# Patient Record
Sex: FEMALE | Race: WHITE | NOT HISPANIC OR LATINO
[De-identification: names, ages, dates, MRNs, and addresses within clinical notes are randomized per-mention and may not be internally consistent; named-entity substitution may affect disease eponyms.]

---

## 2019-04-30 PROBLEM — Z00.00 ENCOUNTER FOR PREVENTIVE HEALTH EXAMINATION: Status: ACTIVE | Noted: 2019-04-30

## 2019-05-01 ENCOUNTER — APPOINTMENT (OUTPATIENT)
Dept: INTERNAL MEDICINE | Facility: CLINIC | Age: 39
End: 2019-05-01
Payer: MEDICAID

## 2019-05-01 VITALS
SYSTOLIC BLOOD PRESSURE: 134 MMHG | WEIGHT: 138.1 LBS | OXYGEN SATURATION: 93 % | TEMPERATURE: 98.4 F | BODY MASS INDEX: 24.17 KG/M2 | HEIGHT: 63.39 IN | DIASTOLIC BLOOD PRESSURE: 100 MMHG | HEART RATE: 99 BPM

## 2019-05-01 DIAGNOSIS — Z78.9 OTHER SPECIFIED HEALTH STATUS: ICD-10-CM

## 2019-05-01 DIAGNOSIS — R41.840 ATTENTION AND CONCENTRATION DEFICIT: ICD-10-CM

## 2019-05-01 DIAGNOSIS — F90.1 ATTENTION-DEFICIT HYPERACTIVITY DISORDER, PREDOMINANTLY HYPERACTIVE TYPE: ICD-10-CM

## 2019-05-01 PROCEDURE — 99203 OFFICE O/P NEW LOW 30 MIN: CPT | Mod: 25

## 2019-05-01 PROCEDURE — G0442 ANNUAL ALCOHOL SCREEN 15 MIN: CPT

## 2019-05-01 PROCEDURE — G0444 DEPRESSION SCREEN ANNUAL: CPT

## 2019-05-01 PROCEDURE — 99385 PREV VISIT NEW AGE 18-39: CPT | Mod: 25

## 2019-05-02 PROBLEM — Z78.9 CURRENT NON-SMOKER: Status: ACTIVE | Noted: 2019-05-02

## 2019-05-02 PROBLEM — Z78.9 CONSUMES ALCOHOL OCCASIONALLY: Status: ACTIVE | Noted: 2019-05-02

## 2019-05-02 PROBLEM — F90.1 ADHD, PREDOMINANTLY HYPERACTIVE-IMPULSIVE SUBTYPE: Status: RESOLVED | Noted: 2019-05-02 | Resolved: 2019-05-02

## 2019-05-02 NOTE — ASSESSMENT
[FreeTextEntry1] : 1) Annual Physical Exam: patient to schedule follow up for her gynecologic exam and Pap smear patient follows up with dentist. Advise sunscreen use and seatbelt use.\par 2) Poor concentration: Patient has clinical features of ADHD, will start patient on Adderall and to use only as needed. Advised to use judiciously within the first 2 weeks the patient has elevated blood pressure advised not to take with alcohol and has no other drug use. Also the patient is an exercise regimen. Patient has child diagnosed with ADHD.\par 3) Elevated Blood Pressure: Repeat blood pressure 130/94, patient asymptomatic during exam. Patient was in a rush to go to a meeting which may have contributed to elevated blood pressure. Advised not to take Adderall frequently during the first 2 weeks given the elevated blood pressure. Patient to return in 2 weeks for blood pressure check, blood work and EKG. Patient follows low salt diet.

## 2019-05-02 NOTE — HISTORY OF PRESENT ILLNESS
[FreeTextEntry1] : Patient presents to establish care, and difficulty concentration [de-identified] : Patient states that she has difficulty with concentration and carrying out multiple tasks. Patient has had this in the past and was diagnosed with ADHD and had been given Habitrol. Patient did not tolerate the side effects and lateral x-ray. Patient denies any alcohol or drug use or history of infections. Patient was never diagnosed with any learning disabilities. Patient has history of parotitis in activities and organizing tasks. This makes patient difficult for patient to fulfill obligations at work. Patient states she feels fidgety and restless. Patient denies any chest pain chest tightness or shortness of breath. Patient denies any anxiety

## 2019-05-02 NOTE — HEALTH RISK ASSESSMENT
[Very Good] : ~his/her~  mood as very good [] : No [de-identified] : None [0] : 2) Feeling down, depressed, or hopeless: Not at all (0) [de-identified] : Healthy, low fat diet [de-identified] : Does not currently exercise [PapSmearComments] : Patient to schedule appointment

## 2019-05-02 NOTE — PHYSICAL EXAM
[Well Nourished] : well nourished [No Acute Distress] : no acute distress [Well-Appearing] : well-appearing [Well Developed] : well developed [Normal Sclera/Conjunctiva] : normal sclera/conjunctiva [PERRL] : pupils equal round and reactive to light [EOMI] : extraocular movements intact [Normal TMs] : both tympanic membranes were normal [Normal Outer Ear/Nose] : the outer ears and nose were normal in appearance [Normal Oropharynx] : the oropharynx was normal [No Lymphadenopathy] : no lymphadenopathy [No JVD] : no jugular venous distention [Supple] : supple [Thyroid Normal, No Nodules] : the thyroid was normal and there were no nodules present [No Respiratory Distress] : no respiratory distress  [Clear to Auscultation] : lungs were clear to auscultation bilaterally [Regular Rhythm] : with a regular rhythm [Normal Rate] : normal rate  [No Accessory Muscle Use] : no accessory muscle use [No Carotid Bruits] : no carotid bruits [Normal S1, S2] : normal S1 and S2 [No Murmur] : no murmur heard [No Abdominal Bruit] : a ~M bruit was not heard ~T in the abdomen [No Varicosities] : no varicosities [Pedal Pulses Present] : the pedal pulses are present [No Extremity Clubbing/Cyanosis] : no extremity clubbing/cyanosis [No Edema] : there was no peripheral edema [Soft] : abdomen soft [Non Tender] : non-tender [No Palpable Aorta] : no palpable aorta [No HSM] : no HSM [No Masses] : no abdominal mass palpated [Non-distended] : non-distended [Normal Axillary Nodes] : no axillary lymphadenopathy [Normal Supraclavicular Nodes] : no supraclavicular lymphadenopathy [Normal Bowel Sounds] : normal bowel sounds [Normal Anterior Cervical Nodes] : no anterior cervical lymphadenopathy [Normal Posterior Cervical Nodes] : no posterior cervical lymphadenopathy [No Spinal Tenderness] : no spinal tenderness [No CVA Tenderness] : no CVA  tenderness [Normal Inguinal Nodes] : no inguinal lymphadenopathy [Grossly Normal Strength/Tone] : grossly normal strength/tone [No Joint Swelling] : no joint swelling [No Skin Lesions] : no skin lesions [Normal Gait] : normal gait [No Rash] : no rash [No Focal Deficits] : no focal deficits [Coordination Grossly Intact] : coordination grossly intact [Speech Grossly Normal] : speech grossly normal [Normal Insight/Judgement] : insight and judgment were intact [Normal Affect] : the affect was normal

## 2020-01-13 ENCOUNTER — APPOINTMENT (OUTPATIENT)
Dept: INTERNAL MEDICINE | Facility: CLINIC | Age: 40
End: 2020-01-13
Payer: MEDICAID

## 2020-01-13 ENCOUNTER — NON-APPOINTMENT (OUTPATIENT)
Age: 40
End: 2020-01-13

## 2020-01-13 VITALS
TEMPERATURE: 97.8 F | BODY MASS INDEX: 22.41 KG/M2 | HEIGHT: 63.39 IN | HEART RATE: 104 BPM | WEIGHT: 128.07 LBS | OXYGEN SATURATION: 99 % | DIASTOLIC BLOOD PRESSURE: 97 MMHG | SYSTOLIC BLOOD PRESSURE: 130 MMHG

## 2020-01-13 DIAGNOSIS — F41.9 ANXIETY DISORDER, UNSPECIFIED: ICD-10-CM

## 2020-01-13 DIAGNOSIS — R25.2 CRAMP AND SPASM: ICD-10-CM

## 2020-01-13 PROCEDURE — 36415 COLL VENOUS BLD VENIPUNCTURE: CPT

## 2020-01-13 PROCEDURE — 99214 OFFICE O/P EST MOD 30 MIN: CPT | Mod: 25

## 2020-01-13 PROCEDURE — 93000 ELECTROCARDIOGRAM COMPLETE: CPT

## 2020-01-15 DIAGNOSIS — R79.89 OTHER SPECIFIED ABNORMAL FINDINGS OF BLOOD CHEMISTRY: ICD-10-CM

## 2020-01-15 LAB
25(OH)D3 SERPL-MCNC: 5.9 NG/ML
ALBUMIN SERPL ELPH-MCNC: 4.9 G/DL
ALP BLD-CCNC: 61 U/L
ALT SERPL-CCNC: 13 U/L
ANION GAP SERPL CALC-SCNC: 14 MMOL/L
AST SERPL-CCNC: 17 U/L
BASOPHILS # BLD AUTO: 0.05 K/UL
BASOPHILS NFR BLD AUTO: 0.5 %
BILIRUB SERPL-MCNC: 0.3 MG/DL
BUN SERPL-MCNC: 15 MG/DL
CALCIUM SERPL-MCNC: 9.5 MG/DL
CHLORIDE SERPL-SCNC: 101 MMOL/L
CHOLEST SERPL-MCNC: 272 MG/DL
CHOLEST/HDLC SERPL: 3.3 RATIO
CO2 SERPL-SCNC: 26 MMOL/L
CREAT SERPL-MCNC: 0.69 MG/DL
EOSINOPHIL # BLD AUTO: 0.02 K/UL
EOSINOPHIL NFR BLD AUTO: 0.2 %
ESTIMATED AVERAGE GLUCOSE: 94 MG/DL
GLUCOSE SERPL-MCNC: 86 MG/DL
HBA1C MFR BLD HPLC: 4.9 %
HCT VFR BLD CALC: 41.3 %
HDLC SERPL-MCNC: 82 MG/DL
HGB BLD-MCNC: 12.8 G/DL
IMM GRANULOCYTES NFR BLD AUTO: 0.4 %
LDLC SERPL CALC-MCNC: 140 MG/DL
LYMPHOCYTES # BLD AUTO: 1.85 K/UL
LYMPHOCYTES NFR BLD AUTO: 16.9 %
MAGNESIUM SERPL-MCNC: 2.1 MG/DL
MAN DIFF?: NORMAL
MCHC RBC-ENTMCNC: 29.4 PG
MCHC RBC-ENTMCNC: 31 GM/DL
MCV RBC AUTO: 94.7 FL
MONOCYTES # BLD AUTO: 0.68 K/UL
MONOCYTES NFR BLD AUTO: 6.2 %
NEUTROPHILS # BLD AUTO: 8.3 K/UL
NEUTROPHILS NFR BLD AUTO: 75.8 %
PLATELET # BLD AUTO: 343 K/UL
POTASSIUM SERPL-SCNC: 4.4 MMOL/L
PROT SERPL-MCNC: 7.4 G/DL
RBC # BLD: 4.36 M/UL
RBC # FLD: 12.9 %
SODIUM SERPL-SCNC: 141 MMOL/L
TRIGL SERPL-MCNC: 248 MG/DL
TSH SERPL-ACNC: 0.9 UIU/ML
WBC # FLD AUTO: 10.94 K/UL

## 2020-01-15 NOTE — ASSESSMENT
[FreeTextEntry1] : 1) Jerking movements: possibly secondary to panic attack, patient may also have had vasovagal episode. Place neurology referral r/o seizure. Check electrolytes, thyroid and magnesium. \par 2) Near syncope: EKG sinus rhythm, no heart block appreciated, place cardiology referral for further recommendations\par 3) Anxiety: Patient has been under stress with recent events, discussed behavioral health counseling and possibly SSRI. Side effects discussed with patient. If so chooses to start rto in 4 weeks. \par 4) Elevated BP: repeat 136/78. Stress anxiety, likely contributing.
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
## 2020-01-16 ENCOUNTER — MESSAGE (OUTPATIENT)
Age: 40
End: 2020-01-16

## 2020-01-16 PROBLEM — R79.89 LOW VITAMIN D LEVEL: Status: ACTIVE | Noted: 2020-01-16

## 2020-08-28 ENCOUNTER — LABORATORY RESULT (OUTPATIENT)
Age: 40
End: 2020-08-28

## 2020-08-28 ENCOUNTER — NON-APPOINTMENT (OUTPATIENT)
Age: 40
End: 2020-08-28

## 2020-08-28 ENCOUNTER — APPOINTMENT (OUTPATIENT)
Dept: INTERNAL MEDICINE | Facility: CLINIC | Age: 40
End: 2020-08-28
Payer: MEDICAID

## 2020-08-28 VITALS
SYSTOLIC BLOOD PRESSURE: 144 MMHG | HEART RATE: 85 BPM | BODY MASS INDEX: 24.76 KG/M2 | OXYGEN SATURATION: 100 % | HEIGHT: 63.39 IN | TEMPERATURE: 98.9 F | DIASTOLIC BLOOD PRESSURE: 102 MMHG | WEIGHT: 141.52 LBS

## 2020-08-28 VITALS — SYSTOLIC BLOOD PRESSURE: 138 MMHG | DIASTOLIC BLOOD PRESSURE: 90 MMHG

## 2020-08-28 DIAGNOSIS — R03.0 ELEVATED BLOOD-PRESSURE READING, W/OUT DIAGNOSIS OF HYPERTENSION: ICD-10-CM

## 2020-08-28 PROCEDURE — 36415 COLL VENOUS BLD VENIPUNCTURE: CPT

## 2020-08-28 PROCEDURE — 99395 PREV VISIT EST AGE 18-39: CPT | Mod: 25

## 2020-08-28 PROCEDURE — 93000 ELECTROCARDIOGRAM COMPLETE: CPT

## 2020-08-28 PROCEDURE — 99213 OFFICE O/P EST LOW 20 MIN: CPT | Mod: 25

## 2020-08-28 RX ORDER — SERTRALINE 25 MG/1
25 TABLET, FILM COATED ORAL
Qty: 60 | Refills: 0 | Status: DISCONTINUED | COMMUNITY
Start: 2020-01-13 | End: 2020-08-28

## 2020-09-02 NOTE — HEALTH RISK ASSESSMENT
[FreeTextEntry1] : health maintenance [Good] : ~his/her~  mood as  good [Yes] : Yes [] : No [2 - 4 times a month (2 pts)] : 2-4 times a month (2 points) [de-identified] : none [de-identified] : none [PapSmearDate] : 1/2020 [Patient reported PAP Smear was normal] : Patient reported PAP Smear was normal

## 2020-09-02 NOTE — HISTORY OF PRESENT ILLNESS
[FreeTextEntry1] : Patient presents for her annual physical [de-identified] : Feels well overall, stress has improved, taking alprazolam as needed. Had Cardiologic workup which was unremarkable. Denies any chest pain shortness of breath palpitations lightheadedness. Denies any nausea vomiting headache

## 2020-09-02 NOTE — ASSESSMENT
[FreeTextEntry1] : Physical exam within normal limits anxiety well-controlled, patient had cardiac workup for elevated blood pressure reading which was unremarkable suspect white coat hypertension advised to keep a log at home and to check blood pressure return to the office in 2 weeks. Check vitamin D levels.

## 2020-09-03 LAB
25(OH)D3 SERPL-MCNC: 19.3 NG/ML
ALBUMIN SERPL ELPH-MCNC: 4.9 G/DL
ALP BLD-CCNC: 52 U/L
ALT SERPL-CCNC: 21 U/L
ANION GAP SERPL CALC-SCNC: 14 MMOL/L
AST SERPL-CCNC: 27 U/L
BASOPHILS # BLD AUTO: 0.04 K/UL
BASOPHILS NFR BLD AUTO: 0.6 %
BILIRUB SERPL-MCNC: 0.3 MG/DL
BUN SERPL-MCNC: 10 MG/DL
CALCIUM SERPL-MCNC: 9.4 MG/DL
CHLORIDE SERPL-SCNC: 100 MMOL/L
CHOLEST SERPL-MCNC: 260 MG/DL
CHOLEST/HDLC SERPL: 3.5 RATIO
CO2 SERPL-SCNC: 26 MMOL/L
CREAT SERPL-MCNC: 0.64 MG/DL
EOSINOPHIL # BLD AUTO: 0 K/UL
EOSINOPHIL NFR BLD AUTO: 0 %
ESTIMATED AVERAGE GLUCOSE: 91 MG/DL
GLUCOSE SERPL-MCNC: 89 MG/DL
HBA1C MFR BLD HPLC: 4.8 %
HCT VFR BLD CALC: 39.6 %
HDLC SERPL-MCNC: 75 MG/DL
HGB BLD-MCNC: 12.4 G/DL
IMM GRANULOCYTES NFR BLD AUTO: 0.3 %
LDLC SERPL CALC-MCNC: 157 MG/DL
LYMPHOCYTES # BLD AUTO: 1.53 K/UL
LYMPHOCYTES NFR BLD AUTO: 21.2 %
MAN DIFF?: NORMAL
MCHC RBC-ENTMCNC: 29.2 PG
MCHC RBC-ENTMCNC: 31.3 GM/DL
MCV RBC AUTO: 93.4 FL
MONOCYTES # BLD AUTO: 0.47 K/UL
MONOCYTES NFR BLD AUTO: 6.5 %
NEUTROPHILS # BLD AUTO: 5.16 K/UL
NEUTROPHILS NFR BLD AUTO: 71.4 %
PLATELET # BLD AUTO: 304 K/UL
POTASSIUM SERPL-SCNC: 4.5 MMOL/L
PROT SERPL-MCNC: 7.4 G/DL
RBC # BLD: 4.24 M/UL
RBC # FLD: 13.2 %
SODIUM SERPL-SCNC: 139 MMOL/L
TRIGL SERPL-MCNC: 139 MG/DL
TSH SERPL-ACNC: 0.77 UIU/ML
WBC # FLD AUTO: 7.22 K/UL

## 2021-01-22 ENCOUNTER — NON-APPOINTMENT (OUTPATIENT)
Age: 41
End: 2021-01-22

## 2021-01-25 ENCOUNTER — NON-APPOINTMENT (OUTPATIENT)
Age: 41
End: 2021-01-25

## 2021-10-13 ENCOUNTER — NON-APPOINTMENT (OUTPATIENT)
Age: 41
End: 2021-10-13

## 2021-10-15 ENCOUNTER — APPOINTMENT (OUTPATIENT)
Dept: INTERNAL MEDICINE | Facility: CLINIC | Age: 41
End: 2021-10-15

## 2021-12-27 ENCOUNTER — NON-APPOINTMENT (OUTPATIENT)
Age: 41
End: 2021-12-27

## 2022-06-09 ENCOUNTER — NON-APPOINTMENT (OUTPATIENT)
Age: 42
End: 2022-06-09

## 2022-06-10 ENCOUNTER — APPOINTMENT (OUTPATIENT)
Dept: NEUROLOGY | Facility: CLINIC | Age: 42
End: 2022-06-10
Payer: MEDICAID

## 2022-06-10 VITALS — DIASTOLIC BLOOD PRESSURE: 130 MMHG | SYSTOLIC BLOOD PRESSURE: 180 MMHG

## 2022-06-10 VITALS — SYSTOLIC BLOOD PRESSURE: 171 MMHG | DIASTOLIC BLOOD PRESSURE: 119 MMHG | HEART RATE: 88 BPM

## 2022-06-10 DIAGNOSIS — R56.9 UNSPECIFIED CONVULSIONS: ICD-10-CM

## 2022-06-10 PROCEDURE — 99243 OFF/OP CNSLTJ NEW/EST LOW 30: CPT

## 2022-06-10 PROCEDURE — 99203 OFFICE O/P NEW LOW 30 MIN: CPT

## 2022-06-10 RX ORDER — DEXTROAMPHETAMINE SACCHARATE, AMPHETAMINE ASPARTATE, DEXTROAMPHETAMINE SULFATE AND AMPHETAMINE SULFATE 2.5; 2.5; 2.5; 2.5 MG/1; MG/1; MG/1; MG/1
10 TABLET ORAL DAILY
Qty: 30 | Refills: 0 | Status: DISCONTINUED | COMMUNITY
Start: 2019-05-01 | End: 2022-06-10

## 2022-06-10 NOTE — DISCUSSION/SUMMARY
[Generalized] : generalized  [Idiopathic] : idiopathic  [Risks Associated with Driving/[place] Law] : As per my usual protocol, the patient was advised in regards to risks and driving privileges associated with the [place] Guidelines.  [Safety Recommendations] : The patient was advised in regards to the risk of seizures and general seizure safety recommendations including not to be bathing alone, climbing to high places and operating heavy machinery. [Compliance with Medications] : The importance of compliance with medications was reinforced. [FreeTextEntry1] : Recurrent LOC x3, concern for convulsion\par Denies xanax misuse\par Recent onset since 2019\par HTN, not controlled, \par Vit D def\par Wt issues\par Anxiety issues\par \par Cardio eval for LOC, HTN\par \par MRI head.\par AEEG requested x48hrs\par safety issues discussed\par consider LTG titration\par \par x41min\par RTC3mo

## 2022-06-10 NOTE — PHYSICAL EXAM
[FreeTextEntry1] : General\par Left tongue bite, lip bruise anteriorly.\par \par Constitutional: alert and in no acute distress. \par Psychiatric: affect normal, insight and judgment intact.\par Neurologic: \par Orientation: oriented to person, place, and time \par Attention: normal concentrating ability and attention was not decreased. \par Language: no difficulty naming common objects, repeating a phrase, writing a sentence; fluency, comprehension, and reading intact. \par Fund of knowledge: displays adequate knowledge of personal past history. \par Cranial Nerves: visual acuity intact bilaterally, visual fields full to confrontation, pupils equal round and reactive to light, extraocular motion intact, facial sensation intact symmetrically, face symmetrical, hearing was intact bilaterally, tongue and palate midline, head turning and shoulder shrug symmetric and there was no tongue deviation with protrusion. \par Motor: the patient is right hand dominant. \par muscle tone was normal in all four extremities, muscle strength was normal in all four extremities and normal bulk in all four extremities. \par Coordination:. normal gait. balance was intact. there was no past-pointing. no tremor present. \par Deep tendon reflexes: \par Biceps right 2+. Biceps left 2+.  \par Triceps right 2+. Triceps left 2+.  \par Brachioradialis right 2+. Brachioradialis left 2+.  \par Patella right 2+. Patella left 2+.  \par Ankle jerk right 2+. Ankle jerk left 2+. \par Eyes: the sclera and conjunctiva were normal. \par Neck: the appearance of the neck was normal. \par Musculoskeletal: no clubbing or cyanosis of the fingernails. \par Skin: no lesions, rash\par
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
## 2022-06-10 NOTE — CONSULT LETTER
[Dear  ___] : Dear  [unfilled], [Consult Letter:] : I had the pleasure of evaluating your patient, [unfilled]. [( Thank you for referring [unfilled] for consultation for _____ )] : Thank you for referring [unfilled] for consultation for [unfilled] [Please see my note below.] : Please see my note below. [Consult Closing:] : Thank you very much for allowing me to participate in the care of this patient.  If you have any questions, please do not hesitate to contact me. [Sincerely,] : Sincerely, [FreeTextEntry2] : Corinna Schumacher MD [FreeTextEntry3] : Carlos Vargas MD, NEREIDA\par Board Certified: Neurology, Clinical Neurophysiology, Epilepsy\par , Department of Neurology\par Epilepsy Fellowship \par [facility]\par \par

## 2022-06-13 ENCOUNTER — TRANSCRIPTION ENCOUNTER (OUTPATIENT)
Age: 42
End: 2022-06-13

## 2022-06-13 ENCOUNTER — NON-APPOINTMENT (OUTPATIENT)
Age: 42
End: 2022-06-13

## 2022-06-13 ENCOUNTER — APPOINTMENT (OUTPATIENT)
Dept: INTERNAL MEDICINE | Facility: CLINIC | Age: 42
End: 2022-06-13
Payer: MEDICAID

## 2022-06-13 VITALS
SYSTOLIC BLOOD PRESSURE: 149 MMHG | OXYGEN SATURATION: 98 % | BODY MASS INDEX: 27.57 KG/M2 | HEART RATE: 50 BPM | DIASTOLIC BLOOD PRESSURE: 104 MMHG | HEIGHT: 62.6 IN | WEIGHT: 153.66 LBS | TEMPERATURE: 98 F

## 2022-06-13 VITALS — SYSTOLIC BLOOD PRESSURE: 144 MMHG | DIASTOLIC BLOOD PRESSURE: 98 MMHG

## 2022-06-13 PROCEDURE — 99214 OFFICE O/P EST MOD 30 MIN: CPT | Mod: 25

## 2022-06-13 PROCEDURE — 99396 PREV VISIT EST AGE 40-64: CPT | Mod: 25

## 2022-06-13 RX ORDER — AMLODIPINE BESYLATE 10 MG/1
10 TABLET ORAL DAILY
Qty: 90 | Refills: 3 | Status: DISCONTINUED | COMMUNITY
Start: 2022-02-18 | End: 2022-06-13

## 2022-06-13 NOTE — ADDENDUM
[FreeTextEntry1] : I, Georgia Jerome, acted as a scribe on behalf of Dr. Del Schumacher MD, on 06/13/2022. \par \par All medical entries made by the scribe were at my, Dr. Del Schumacher MD, direction and personally dictated by me on 06/13/2022. I have reviewed the chart and agree that the record accurately reflects my personal performance of the history, physical exam, assessment and plan. I have also personally directed, reviewed, and agreed with the chart.

## 2022-06-13 NOTE — REVIEW OF SYSTEMS
[Negative] : Heme/Lymph [Confusion] : confusion [FreeTextEntry9] : Shoulder pain and R neck pain. [de-identified] : Seizure episode

## 2022-06-13 NOTE — HISTORY OF PRESENT ILLNESS
[FreeTextEntry1] : Patient presents for annual physical exam. [de-identified] : Patient notes she was in the hospital for seizure. EKG was done in the hospital\par Pt States she hit her head, two contusions and LOC.\par She was confused, notes no precursor symptoms and Bit her lip. \par Denies any blurry vision, double vision.\par Denies flashing lights, or sounds.\par Neurology doubled her blood pressure medication and currently blood pressure came down.\par Pt will have appointment with cardiology.\par \par She has been watching her diet.\par Drinks 2-3 glasses of wine a week. Denies any drug use.\par Denies CP, chest tightness or SOB.\par Stress has been high. She has been going for walks.\par \par Also c/o Shoulder pain and R neck pain going on for 1 month. Denies any radiating pain.\par Pt has been to GYN and will have scheduled MRI.

## 2022-06-13 NOTE — ASSESSMENT
[FreeTextEntry1] : Annual Physical Exam\par -Blood work done today.\par -Repeat blood pressure is acceptable. Start trial of amlodipine - olmesartan.-f/u cardiology, repeat within 2 weeks\par -Repeat EKG today.\par -Continue f/u with neurology\par -Pt has appointment with GYN and Cardiology\par -Pt has scheduled MRI in a week.\par -RTO annually or as needed.

## 2022-06-14 RX ORDER — AMLODIPINE AND OLMESARTAN MEDOXOMIL 10; 20 MG/1; MG/1
10-20 TABLET ORAL DAILY
Qty: 90 | Refills: 3 | Status: DISCONTINUED | COMMUNITY
Start: 2022-06-13 | End: 2022-06-14

## 2022-06-18 ENCOUNTER — TRANSCRIPTION ENCOUNTER (OUTPATIENT)
Age: 42
End: 2022-06-18

## 2022-06-18 LAB
25(OH)D3 SERPL-MCNC: 7.2 NG/ML
ALBUMIN SERPL ELPH-MCNC: 4.8 G/DL
ALP BLD-CCNC: 65 U/L
ALT SERPL-CCNC: 24 U/L
ANION GAP SERPL CALC-SCNC: 13 MMOL/L
AST SERPL-CCNC: 25 U/L
BASOPHILS # BLD AUTO: 0.04 K/UL
BASOPHILS NFR BLD AUTO: 0.5 %
BILIRUB SERPL-MCNC: 0.3 MG/DL
BUN SERPL-MCNC: 7 MG/DL
CALCIUM SERPL-MCNC: 9.4 MG/DL
CHLORIDE SERPL-SCNC: 100 MMOL/L
CHOLEST SERPL-MCNC: 269 MG/DL
CO2 SERPL-SCNC: 25 MMOL/L
CREAT SERPL-MCNC: 0.61 MG/DL
EGFR: 116 ML/MIN/1.73M2
EOSINOPHIL # BLD AUTO: 0.01 K/UL
EOSINOPHIL NFR BLD AUTO: 0.1 %
ESTIMATED AVERAGE GLUCOSE: 100 MG/DL
GLUCOSE SERPL-MCNC: 98 MG/DL
HBA1C MFR BLD HPLC: 5.1 %
HCT VFR BLD CALC: 38.3 %
HDLC SERPL-MCNC: 80 MG/DL
HGB BLD-MCNC: 12 G/DL
IMM GRANULOCYTES NFR BLD AUTO: 0.2 %
LDLC SERPL CALC-MCNC: 146 MG/DL
LYMPHOCYTES # BLD AUTO: 1.47 K/UL
LYMPHOCYTES NFR BLD AUTO: 17.8 %
MAN DIFF?: NORMAL
MCHC RBC-ENTMCNC: 29.3 PG
MCHC RBC-ENTMCNC: 31.3 GM/DL
MCV RBC AUTO: 93.6 FL
MONOCYTES # BLD AUTO: 0.62 K/UL
MONOCYTES NFR BLD AUTO: 7.5 %
NEUTROPHILS # BLD AUTO: 6.12 K/UL
NEUTROPHILS NFR BLD AUTO: 73.9 %
NONHDLC SERPL-MCNC: 189 MG/DL
PLATELET # BLD AUTO: 317 K/UL
POTASSIUM SERPL-SCNC: 4 MMOL/L
PROT SERPL-MCNC: 7.1 G/DL
RBC # BLD: 4.09 M/UL
RBC # FLD: 14.6 %
SODIUM SERPL-SCNC: 138 MMOL/L
TRIGL SERPL-MCNC: 212 MG/DL
TSH SERPL-ACNC: 1.23 UIU/ML
VIT B12 SERPL-MCNC: 510 PG/ML
WBC # FLD AUTO: 8.28 K/UL

## 2022-06-23 ENCOUNTER — NON-APPOINTMENT (OUTPATIENT)
Age: 42
End: 2022-06-23

## 2022-07-02 ENCOUNTER — TRANSCRIPTION ENCOUNTER (OUTPATIENT)
Age: 42
End: 2022-07-02

## 2022-07-03 ENCOUNTER — TRANSCRIPTION ENCOUNTER (OUTPATIENT)
Age: 42
End: 2022-07-03

## 2022-07-18 ENCOUNTER — APPOINTMENT (OUTPATIENT)
Dept: NEUROLOGY | Facility: CLINIC | Age: 42
End: 2022-07-18

## 2022-08-01 ENCOUNTER — TRANSCRIPTION ENCOUNTER (OUTPATIENT)
Age: 42
End: 2022-08-01

## 2022-08-29 ENCOUNTER — APPOINTMENT (OUTPATIENT)
Dept: NEUROLOGY | Facility: CLINIC | Age: 42
End: 2022-08-29

## 2022-08-31 ENCOUNTER — TRANSCRIPTION ENCOUNTER (OUTPATIENT)
Age: 42
End: 2022-08-31

## 2022-09-19 ENCOUNTER — EMERGENCY (EMERGENCY)
Facility: HOSPITAL | Age: 42
LOS: 1 days | Discharge: HOME | End: 2022-09-19
Admitting: EMERGENCY MEDICINE

## 2022-09-19 VITALS
TEMPERATURE: 98 F | HEART RATE: 89 BPM | RESPIRATION RATE: 18 BRPM | DIASTOLIC BLOOD PRESSURE: 100 MMHG | HEIGHT: 63 IN | OXYGEN SATURATION: 100 % | SYSTOLIC BLOOD PRESSURE: 169 MMHG

## 2022-09-19 PROCEDURE — 99284 EMERGENCY DEPT VISIT MOD MDM: CPT

## 2022-09-19 NOTE — ED PROVIDER NOTE - OBJECTIVE STATEMENT
40 Y/O F PMH Seizure Disorder, HTN states that in July she had a seizure and was seen at [facility]. States she had bit her tongue, states she had a CT of her head that she states was neg for facial fractures or intracranial bleeding. Pt states she has had increasing L sided facial swelling and mild pain for the past week. Pt states she was called in Amoxicillin 500 BID by the endodontist her mother works for which she has been taking for the past week but states the swelling has worsened. Pt states she took Ibuprofen 600 2 hours prior to ED arrival. Pt denies fever, chills, nightsweats or any other sx or acute complaints.

## 2022-09-19 NOTE — ED PROVIDER NOTE - PATIENT PORTAL LINK FT
You can access the FollowMyHealth Patient Portal offered by [facility] by registering at the following website: [email or web address]. By joining [employer]’s FollowMyHealth portal, you will also be able to view your health information using other applications (apps) compatible with our system.

## 2022-09-19 NOTE — ED PROVIDER NOTE - NSFOLLOWUPINSTRUCTIONS_ED_ALL_ED_FT
Call the [facility]   Advance activity as tolerated.  Continue all previously prescribed medications as directed.  Follow up with your primary care physician in 48-72 hours- bring copies of your results.  Return to the ER for worsening or persistent symptoms, and/or ANY NEW OR CONCERNING SYMPTOMS. If you have issues obtaining follow up, please call: [phone] to obtain a doctor or specialist who takes your insurance in your area.  You may call [phone] to make an appointment with the internal medicine clinic. Call the [facility] Dental clinic at [phone] [facility] [phone] at 830AM tomorrow. The phone number is . Call at 830AM when the clinic opens. Advance activity as tolerated.  Continue all previously prescribed medications as directed.  Follow up with your primary care physician in 48-72 hours- bring copies of your results.  Return to the ER for worsening or persistent symptoms, and/or ANY NEW OR CONCERNING SYMPTOMS.THIS INCLUDES BUT IS NOT LIMITED TO FEVER, CHILLS, NIGHTSWEATS, INCREASING REDNESS OR SWELLING OR FOR ANY OTHER SYMPTOMS THAT CONCERN YOU. If you have issues obtaining follow up, please call: [phone] to obtain a doctor or specialist who takes your insurance in your area.  You may call [phone] to make an appointment with the internal medicine clinic.

## 2022-09-19 NOTE — ED PROVIDER NOTE - CLINICAL SUMMARY MEDICAL DECISION MAKING FREE TEXT BOX
42 Y/O F PMH Seizure Disorder, HTN states that in July she had a seizure and was seen at [facility]. States she had bit her tongue, states she had a CT of her head that she states was neg for facial fractures or bleeding. Discussed with the dental resident, will attempt to expedite the patient for clinic follow up tomorrow, will send Augmentin and Naproxen to the patient's pharmacy.

## 2022-09-19 NOTE — ED PROVIDER NOTE - PHYSICAL EXAMINATION
HEENT: + Extraoral swelling. No gingival hypertrophy, erythema or fluctuance consistent with abscess.

## 2022-09-23 ENCOUNTER — APPOINTMENT (OUTPATIENT)
Dept: NEUROLOGY | Facility: CLINIC | Age: 42
End: 2022-09-23

## 2022-09-26 ENCOUNTER — APPOINTMENT (OUTPATIENT)
Dept: NEUROLOGY | Facility: CLINIC | Age: 42
End: 2022-09-26

## 2022-09-28 ENCOUNTER — TRANSCRIPTION ENCOUNTER (OUTPATIENT)
Age: 42
End: 2022-09-28

## 2022-10-28 ENCOUNTER — TRANSCRIPTION ENCOUNTER (OUTPATIENT)
Age: 42
End: 2022-10-28

## 2022-11-28 ENCOUNTER — TRANSCRIPTION ENCOUNTER (OUTPATIENT)
Age: 42
End: 2022-11-28

## 2022-12-28 ENCOUNTER — TRANSCRIPTION ENCOUNTER (OUTPATIENT)
Age: 42
End: 2022-12-28

## 2023-01-27 ENCOUNTER — TRANSCRIPTION ENCOUNTER (OUTPATIENT)
Age: 43
End: 2023-01-27

## 2023-02-23 ENCOUNTER — TRANSCRIPTION ENCOUNTER (OUTPATIENT)
Age: 43
End: 2023-02-23

## 2023-03-10 NOTE — ED ADULT TRIAGE NOTE - CHIEF COMPLAINT QUOTE
Please call pt regarding cough. Has had for past 3 days. No fever   pt c/o left lower tooth pain and was diagnosed with an infection.  + swelling noted to left side of face. pt is non-complaint with BP meds

## 2023-03-14 ENCOUNTER — TRANSCRIPTION ENCOUNTER (OUTPATIENT)
Age: 43
End: 2023-03-14

## 2023-04-15 ENCOUNTER — TRANSCRIPTION ENCOUNTER (OUTPATIENT)
Age: 43
End: 2023-04-15

## 2023-04-15 ENCOUNTER — RX RENEWAL (OUTPATIENT)
Age: 43
End: 2023-04-15

## 2023-05-17 ENCOUNTER — TRANSCRIPTION ENCOUNTER (OUTPATIENT)
Age: 43
End: 2023-05-17

## 2023-05-18 ENCOUNTER — RX RENEWAL (OUTPATIENT)
Age: 43
End: 2023-05-18

## 2023-06-20 ENCOUNTER — TRANSCRIPTION ENCOUNTER (OUTPATIENT)
Age: 43
End: 2023-06-20

## 2023-07-20 ENCOUNTER — TRANSCRIPTION ENCOUNTER (OUTPATIENT)
Age: 43
End: 2023-07-20

## 2023-07-31 ENCOUNTER — EMERGENCY (EMERGENCY)
Facility: HOSPITAL | Age: 43
LOS: 1 days | Discharge: HOME | End: 2023-07-31
Attending: EMERGENCY MEDICINE | Admitting: EMERGENCY MEDICINE
Payer: MEDICAID

## 2023-07-31 VITALS
OXYGEN SATURATION: 100 % | RESPIRATION RATE: 18 BRPM | TEMPERATURE: 98 F | HEART RATE: 90 BPM | SYSTOLIC BLOOD PRESSURE: 180 MMHG | DIASTOLIC BLOOD PRESSURE: 133 MMHG

## 2023-07-31 PROCEDURE — 99283 EMERGENCY DEPT VISIT LOW MDM: CPT

## 2023-07-31 RX ORDER — ALPRAZOLAM 0.25 MG
1 TABLET ORAL ONCE
Refills: 0 | Status: DISCONTINUED | OUTPATIENT
Start: 2023-07-31 | End: 2023-07-31

## 2023-07-31 RX ORDER — LOSARTAN POTASSIUM 100 MG/1
50 TABLET, FILM COATED ORAL ONCE
Refills: 0 | Status: COMPLETED | OUTPATIENT
Start: 2023-07-31 | End: 2023-07-31

## 2023-07-31 RX ADMIN — Medication 1 MILLIGRAM(S): at 23:48

## 2023-07-31 RX ADMIN — LOSARTAN POTASSIUM 50 MILLIGRAM(S): 100 TABLET, FILM COATED ORAL at 23:48

## 2023-07-31 NOTE — ED ADULT TRIAGE NOTE - CHIEF COMPLAINT QUOTE
[facility]. Patient reports she needs her xanax and blood pressure med or she will have a seizure. Reports headache and dizzy and that is normal when she misses her medications. Phx HTN, seizures

## 2023-07-31 NOTE — ED PROVIDER NOTE - NSICDXFAMILYHX_GEN_ALL_CORE_FT
LVM notifying she needs an annual scheduled before refills will be sent to pharmacy.      Please schedule annual when pt calls back   
FAMILY HISTORY:  No pertinent family history in first degree relatives

## 2023-07-31 NOTE — ED PROVIDER NOTE - CLINICAL SUMMARY MEDICAL DECISION MAKING FREE TEXT BOX
Patient here the past with history of blood pressure and anxiety presenting in need of medications while she is in custody.  We will give her daily medications and can return to please custody.  There are no acute medical needs at this time beyond that.

## 2023-07-31 NOTE — ED ADULT NURSE NOTE - OBJECTIVE STATEMENT
43 y/o female, a&ox4, brought in by [facility] under arrest for medication. Past medical history of HTN, anxiety, and seizures, pt states "when I don't take my meds, I get seizures." Pt Denies CP, SOB, dyspnea, or pain at this time. Pt medicated as per MD orders. Respirations are even and unlabored, no signs of respiratory distress.

## 2023-07-31 NOTE — ED ADULT NURSE NOTE - NSFALLUNIVINTERV_ED_ALL_ED
Bed/Stretcher in lowest position, wheels locked, appropriate side rails in place/Call bell, personal items and telephone in reach/Instruct patient to call for assistance before getting out of bed/chair/stretcher/Non-slip footwear applied when patient is off stretcher/[place] to call system/Physically safe environment - no spills, clutter or unnecessary equipment/Purposeful proactive rounding/Room/bathroom lighting operational, light cord in reach

## 2023-07-31 NOTE — ED ADULT NURSE NOTE - CHIEF COMPLAINT QUOTE
[facility]. Patient reports she needs her xanax and blood pressure med or she will have a seizure. Reports headache and dizzy and that is normal when she misses her medications. Phx HTN, seizures

## 2023-07-31 NOTE — ED PROVIDER NOTE - NSFOLLOWUPINSTRUCTIONS_ED_ALL_ED_FT
Resume all of your home medications once you are released from custody.  If more medications are required please let them know and you can be seen in the hospital again.  Return for any new concerns.

## 2023-07-31 NOTE — ED PROVIDER NOTE - PATIENT PORTAL LINK FT
You can access the FollowMyHealth Patient Portal offered by [facility] by registering at the following website: [email or web address]. By joining [employer]’s FollowMyHealth portal, you will also be able to view your health information using other applications (apps) compatible with our system.

## 2023-07-31 NOTE — ED PROVIDER NOTE - OBJECTIVE STATEMENT
42-year-old female past medical history of anxiety and hypertension presenting in police custody without her daily medications.  States she routinely takes 0.5 mg of Xanax and 20 mg of Benicar daily.  There is concern that she will have a seizure without her medications.  Slightly feeling anxious this time no other symptoms reported.

## 2023-08-01 PROBLEM — I10 ESSENTIAL (PRIMARY) HYPERTENSION: Chronic | Status: ACTIVE | Noted: 2022-09-19

## 2023-08-01 PROBLEM — Z86.69 PERSONAL HISTORY OF OTHER DISEASES OF THE NERVOUS SYSTEM AND SENSE ORGANS: Chronic | Status: ACTIVE | Noted: 2022-09-19

## 2023-08-19 ENCOUNTER — TRANSCRIPTION ENCOUNTER (OUTPATIENT)
Age: 43
End: 2023-08-19

## 2023-08-21 ENCOUNTER — TRANSCRIPTION ENCOUNTER (OUTPATIENT)
Age: 43
End: 2023-08-21

## 2023-09-14 ENCOUNTER — RX RENEWAL (OUTPATIENT)
Age: 43
End: 2023-09-14

## 2023-09-19 ENCOUNTER — TRANSCRIPTION ENCOUNTER (OUTPATIENT)
Age: 43
End: 2023-09-19

## 2023-09-19 ENCOUNTER — APPOINTMENT (OUTPATIENT)
Dept: INTERNAL MEDICINE | Facility: CLINIC | Age: 43
End: 2023-09-19
Payer: MEDICAID

## 2023-09-19 ENCOUNTER — LABORATORY RESULT (OUTPATIENT)
Age: 43
End: 2023-09-19

## 2023-09-19 ENCOUNTER — NON-APPOINTMENT (OUTPATIENT)
Age: 43
End: 2023-09-19

## 2023-09-19 VITALS — DIASTOLIC BLOOD PRESSURE: 96 MMHG | SYSTOLIC BLOOD PRESSURE: 152 MMHG

## 2023-09-19 VITALS
DIASTOLIC BLOOD PRESSURE: 105 MMHG | OXYGEN SATURATION: 98 % | SYSTOLIC BLOOD PRESSURE: 158 MMHG | HEART RATE: 66 BPM | TEMPERATURE: 98.1 F

## 2023-09-19 DIAGNOSIS — Z00.00 ENCOUNTER FOR GENERAL ADULT MEDICAL EXAMINATION W/OUT ABNORMAL FINDINGS: ICD-10-CM

## 2023-09-19 DIAGNOSIS — I10 ESSENTIAL (PRIMARY) HYPERTENSION: ICD-10-CM

## 2023-09-19 PROCEDURE — 36415 COLL VENOUS BLD VENIPUNCTURE: CPT

## 2023-09-19 PROCEDURE — 99396 PREV VISIT EST AGE 40-64: CPT | Mod: 25

## 2023-09-19 PROCEDURE — 93000 ELECTROCARDIOGRAM COMPLETE: CPT | Mod: 59

## 2023-09-19 RX ORDER — LAMOTRIGINE 25 MG/1
25 TABLET ORAL
Qty: 180 | Refills: 1 | Status: DISCONTINUED | COMMUNITY
Start: 2022-06-10 | End: 2023-09-19

## 2023-09-19 RX ORDER — AMLODIPINE BESYLATE 10 MG/1
10 TABLET ORAL DAILY
Qty: 90 | Refills: 3 | Status: ACTIVE | COMMUNITY
Start: 2022-06-14 | End: 1900-01-01

## 2023-09-19 RX ORDER — NAPROXEN 500 MG/1
500 TABLET ORAL
Qty: 30 | Refills: 0 | Status: DISCONTINUED | COMMUNITY
Start: 2023-02-23 | End: 2023-09-19

## 2023-09-19 RX ORDER — OLMESARTAN MEDOXOMIL 20 MG/1
20 TABLET, FILM COATED ORAL
Qty: 90 | Refills: 3 | Status: ACTIVE | COMMUNITY
Start: 2022-06-14 | End: 1900-01-01

## 2023-09-25 LAB
25(OH)D3 SERPL-MCNC: 17.6 NG/ML
ALBUMIN SERPL ELPH-MCNC: 5 G/DL
ALP BLD-CCNC: 64 U/L
ALT SERPL-CCNC: 21 U/L
ANION GAP SERPL CALC-SCNC: 14 MMOL/L
APPEARANCE: CLEAR
AST SERPL-CCNC: 20 U/L
BASOPHILS # BLD AUTO: 0.06 K/UL
BASOPHILS NFR BLD AUTO: 0.6 %
BILIRUB SERPL-MCNC: 0.6 MG/DL
BILIRUBIN URINE: NEGATIVE
BLOOD URINE: ABNORMAL
BUN SERPL-MCNC: 8 MG/DL
CALCIUM SERPL-MCNC: 9.4 MG/DL
CHLORIDE SERPL-SCNC: 100 MMOL/L
CHOLEST SERPL-MCNC: 257 MG/DL
CO2 SERPL-SCNC: 24 MMOL/L
COLOR: YELLOW
CREAT SERPL-MCNC: 0.54 MG/DL
EGFR: 119 ML/MIN/1.73M2
EOSINOPHIL # BLD AUTO: 0.14 K/UL
EOSINOPHIL NFR BLD AUTO: 1.4 %
ESTIMATED AVERAGE GLUCOSE: 100 MG/DL
GLUCOSE QUALITATIVE U: NEGATIVE MG/DL
GLUCOSE SERPL-MCNC: 93 MG/DL
HBA1C MFR BLD HPLC: 5.1 %
HCT VFR BLD CALC: 39.7 %
HDLC SERPL-MCNC: 76 MG/DL
HGB BLD-MCNC: 12.8 G/DL
IMM GRANULOCYTES NFR BLD AUTO: 0.3 %
KETONES URINE: NEGATIVE MG/DL
LDLC SERPL CALC-MCNC: 165 MG/DL
LEUKOCYTE ESTERASE URINE: NEGATIVE
LYMPHOCYTES # BLD AUTO: 1.46 K/UL
LYMPHOCYTES NFR BLD AUTO: 14.9 %
MAN DIFF?: NORMAL
MCHC RBC-ENTMCNC: 28.8 PG
MCHC RBC-ENTMCNC: 32.2 GM/DL
MCV RBC AUTO: 89.2 FL
METANEPHRINE, PL: 30.2 PG/ML
MONOCYTES # BLD AUTO: 0.63 K/UL
MONOCYTES NFR BLD AUTO: 6.4 %
NEUTROPHILS # BLD AUTO: 7.47 K/UL
NEUTROPHILS NFR BLD AUTO: 76.4 %
NITRITE URINE: NEGATIVE
NONHDLC SERPL-MCNC: 181 MG/DL
NORMETANEPHRINE, PL: 359.6 PG/ML
PH URINE: 5.5
PLATELET # BLD AUTO: 323 K/UL
POTASSIUM SERPL-SCNC: 4.3 MMOL/L
PROT SERPL-MCNC: 7.6 G/DL
PROTEIN URINE: NORMAL MG/DL
RBC # BLD: 4.45 M/UL
RBC # FLD: 14.3 %
SODIUM SERPL-SCNC: 138 MMOL/L
SPECIFIC GRAVITY URINE: 1.02
TRIGL SERPL-MCNC: 97 MG/DL
TSH SERPL-ACNC: 0.76 UIU/ML
UROBILINOGEN URINE: 0.2 MG/DL
VIT B12 SERPL-MCNC: 475 PG/ML
WBC # FLD AUTO: 9.79 K/UL

## 2023-10-11 ENCOUNTER — RX RENEWAL (OUTPATIENT)
Age: 43
End: 2023-10-11

## 2023-10-11 RX ORDER — ERGOCALCIFEROL 1.25 MG/1
1.25 MG CAPSULE, LIQUID FILLED ORAL
Qty: 12 | Refills: 3 | Status: ACTIVE | COMMUNITY
Start: 2020-01-16 | End: 1900-01-01

## 2023-10-20 ENCOUNTER — TRANSCRIPTION ENCOUNTER (OUTPATIENT)
Age: 43
End: 2023-10-20

## 2023-12-16 ENCOUNTER — RX RENEWAL (OUTPATIENT)
Age: 43
End: 2023-12-16

## 2024-01-22 ENCOUNTER — RX RENEWAL (OUTPATIENT)
Age: 44
End: 2024-01-22

## 2024-02-20 ENCOUNTER — RX RENEWAL (OUTPATIENT)
Age: 44
End: 2024-02-20

## 2024-03-22 ENCOUNTER — RX RENEWAL (OUTPATIENT)
Age: 44
End: 2024-03-22

## 2024-04-14 ENCOUNTER — EMERGENCY (EMERGENCY)
Facility: HOSPITAL | Age: 44
LOS: 1 days | Discharge: PSYCHIATRIC OR SUBSTANCE USE FACILITY | End: 2024-04-14
Attending: EMERGENCY MEDICINE
Payer: MEDICAID

## 2024-04-14 VITALS
HEART RATE: 130 BPM | OXYGEN SATURATION: 100 % | DIASTOLIC BLOOD PRESSURE: 121 MMHG | SYSTOLIC BLOOD PRESSURE: 161 MMHG | RESPIRATION RATE: 18 BRPM | WEIGHT: 134.92 LBS | HEIGHT: 63 IN

## 2024-04-14 LAB
ALBUMIN SERPL ELPH-MCNC: 4.8 G/DL — SIGNIFICANT CHANGE UP (ref 3.3–5)
ALP SERPL-CCNC: 64 U/L — SIGNIFICANT CHANGE UP (ref 40–120)
ALT FLD-CCNC: 23 U/L — SIGNIFICANT CHANGE UP (ref 10–45)
AMPHET UR-MCNC: POSITIVE
ANION GAP SERPL CALC-SCNC: 12 MMOL/L — SIGNIFICANT CHANGE UP (ref 5–17)
APAP SERPL-MCNC: <15 UG/ML — SIGNIFICANT CHANGE UP (ref 10–30)
APPEARANCE UR: CLEAR — SIGNIFICANT CHANGE UP
AST SERPL-CCNC: 19 U/L — SIGNIFICANT CHANGE UP (ref 10–40)
BACTERIA # UR AUTO: ABNORMAL /HPF
BARBITURATES UR SCN-MCNC: NEGATIVE — SIGNIFICANT CHANGE UP
BASOPHILS # BLD AUTO: 0.05 K/UL — SIGNIFICANT CHANGE UP (ref 0–0.2)
BASOPHILS NFR BLD AUTO: 0.5 % — SIGNIFICANT CHANGE UP (ref 0–2)
BENZODIAZ UR-MCNC: NEGATIVE — SIGNIFICANT CHANGE UP
BILIRUB SERPL-MCNC: 0.3 MG/DL — SIGNIFICANT CHANGE UP (ref 0.2–1.2)
BILIRUB UR-MCNC: NEGATIVE — SIGNIFICANT CHANGE UP
BUN SERPL-MCNC: 11 MG/DL — SIGNIFICANT CHANGE UP (ref 7–23)
CALCIUM SERPL-MCNC: 9.5 MG/DL — SIGNIFICANT CHANGE UP (ref 8.4–10.5)
CAST: 0 /LPF — SIGNIFICANT CHANGE UP (ref 0–4)
CHLORIDE SERPL-SCNC: 102 MMOL/L — SIGNIFICANT CHANGE UP (ref 96–108)
CO2 SERPL-SCNC: 23 MMOL/L — SIGNIFICANT CHANGE UP (ref 22–31)
COCAINE METAB.OTHER UR-MCNC: NEGATIVE — SIGNIFICANT CHANGE UP
COLOR SPEC: YELLOW — SIGNIFICANT CHANGE UP
CREAT SERPL-MCNC: 0.56 MG/DL — SIGNIFICANT CHANGE UP (ref 0.5–1.3)
DIFF PNL FLD: ABNORMAL
EGFR: 117 ML/MIN/1.73M2 — SIGNIFICANT CHANGE UP
EOSINOPHIL # BLD AUTO: 0.29 K/UL — SIGNIFICANT CHANGE UP (ref 0–0.5)
EOSINOPHIL NFR BLD AUTO: 3 % — SIGNIFICANT CHANGE UP (ref 0–6)
ETHANOL SERPL-MCNC: <10 MG/DL — SIGNIFICANT CHANGE UP (ref 0–10)
FLUAV AG NPH QL: SIGNIFICANT CHANGE UP
FLUBV AG NPH QL: SIGNIFICANT CHANGE UP
GLUCOSE SERPL-MCNC: 111 MG/DL — HIGH (ref 70–99)
GLUCOSE UR QL: NEGATIVE MG/DL — SIGNIFICANT CHANGE UP
HCG SERPL-ACNC: <2 MIU/ML — SIGNIFICANT CHANGE UP
HCT VFR BLD CALC: 42.4 % — SIGNIFICANT CHANGE UP (ref 34.5–45)
HGB BLD-MCNC: 13.9 G/DL — SIGNIFICANT CHANGE UP (ref 11.5–15.5)
IMM GRANULOCYTES NFR BLD AUTO: 0.3 % — SIGNIFICANT CHANGE UP (ref 0–0.9)
KETONES UR-MCNC: NEGATIVE MG/DL — SIGNIFICANT CHANGE UP
LEUKOCYTE ESTERASE UR-ACNC: NEGATIVE — SIGNIFICANT CHANGE UP
LYMPHOCYTES # BLD AUTO: 1.81 K/UL — SIGNIFICANT CHANGE UP (ref 1–3.3)
LYMPHOCYTES # BLD AUTO: 18.4 % — SIGNIFICANT CHANGE UP (ref 13–44)
MCHC RBC-ENTMCNC: 28.3 PG — SIGNIFICANT CHANGE UP (ref 27–34)
MCHC RBC-ENTMCNC: 32.8 GM/DL — SIGNIFICANT CHANGE UP (ref 32–36)
MCV RBC AUTO: 86.4 FL — SIGNIFICANT CHANGE UP (ref 80–100)
METHADONE UR-MCNC: NEGATIVE — SIGNIFICANT CHANGE UP
MONOCYTES # BLD AUTO: 0.54 K/UL — SIGNIFICANT CHANGE UP (ref 0–0.9)
MONOCYTES NFR BLD AUTO: 5.5 % — SIGNIFICANT CHANGE UP (ref 2–14)
NEUTROPHILS # BLD AUTO: 7.11 K/UL — SIGNIFICANT CHANGE UP (ref 1.8–7.4)
NEUTROPHILS NFR BLD AUTO: 72.3 % — SIGNIFICANT CHANGE UP (ref 43–77)
NITRITE UR-MCNC: NEGATIVE — SIGNIFICANT CHANGE UP
NRBC # BLD: 0 /100 WBCS — SIGNIFICANT CHANGE UP (ref 0–0)
OPIATES UR-MCNC: NEGATIVE — SIGNIFICANT CHANGE UP
OXYCODONE UR-MCNC: NEGATIVE — SIGNIFICANT CHANGE UP
PCP SPEC-MCNC: SIGNIFICANT CHANGE UP
PCP UR-MCNC: NEGATIVE — SIGNIFICANT CHANGE UP
PH UR: 7 — SIGNIFICANT CHANGE UP (ref 5–8)
PLATELET # BLD AUTO: 381 K/UL — SIGNIFICANT CHANGE UP (ref 150–400)
POTASSIUM SERPL-MCNC: 3.4 MMOL/L — LOW (ref 3.5–5.3)
POTASSIUM SERPL-SCNC: 3.4 MMOL/L — LOW (ref 3.5–5.3)
PROT SERPL-MCNC: 8.1 G/DL — SIGNIFICANT CHANGE UP (ref 6–8.3)
PROT UR-MCNC: NEGATIVE MG/DL — SIGNIFICANT CHANGE UP
RBC # BLD: 4.91 M/UL — SIGNIFICANT CHANGE UP (ref 3.8–5.2)
RBC # FLD: 12.6 % — SIGNIFICANT CHANGE UP (ref 10.3–14.5)
RBC CASTS # UR COMP ASSIST: 3 /HPF — SIGNIFICANT CHANGE UP (ref 0–4)
REVIEW: SIGNIFICANT CHANGE UP
RSV RNA NPH QL NAA+NON-PROBE: SIGNIFICANT CHANGE UP
SALICYLATES SERPL-MCNC: <2 MG/DL — LOW (ref 15–30)
SARS-COV-2 RNA SPEC QL NAA+PROBE: SIGNIFICANT CHANGE UP
SODIUM SERPL-SCNC: 137 MMOL/L — SIGNIFICANT CHANGE UP (ref 135–145)
SP GR SPEC: 1.01 — SIGNIFICANT CHANGE UP (ref 1–1.03)
SQUAMOUS # UR AUTO: 11 /HPF — HIGH (ref 0–5)
THC UR QL: POSITIVE
UROBILINOGEN FLD QL: 0.2 MG/DL — SIGNIFICANT CHANGE UP (ref 0.2–1)
WBC # BLD: 9.83 K/UL — SIGNIFICANT CHANGE UP (ref 3.8–10.5)
WBC # FLD AUTO: 9.83 K/UL — SIGNIFICANT CHANGE UP (ref 3.8–10.5)
WBC UR QL: 3 /HPF — SIGNIFICANT CHANGE UP (ref 0–5)

## 2024-04-14 PROCEDURE — 99285 EMERGENCY DEPT VISIT HI MDM: CPT

## 2024-04-14 PROCEDURE — 71046 X-RAY EXAM CHEST 2 VIEWS: CPT | Mod: 26

## 2024-04-14 PROCEDURE — 70450 CT HEAD/BRAIN W/O DYE: CPT | Mod: 26,MC

## 2024-04-14 RX ORDER — HALOPERIDOL DECANOATE 100 MG/ML
5 INJECTION INTRAMUSCULAR ONCE
Refills: 0 | Status: DISCONTINUED | OUTPATIENT
Start: 2024-04-14 | End: 2024-04-15

## 2024-04-14 RX ORDER — ALPRAZOLAM 0.25 MG
0.5 TABLET ORAL ONCE
Refills: 0 | Status: DISCONTINUED | OUTPATIENT
Start: 2024-04-14 | End: 2024-04-14

## 2024-04-14 RX ADMIN — Medication 0.5 MILLIGRAM(S): at 23:31

## 2024-04-14 NOTE — ED ADULT NURSE REASSESSMENT NOTE - NS ED NURSE REASSESS COMMENT FT1
upon approached pt. is demanding to be discharge, stated "there is nothing wrong w/ me, I called police, but they ignored me", reiterated to pt. that the psychiatrist will have the final decision if she will be discharge or reassess further by day psychiatrist.  medicated w/ xanax 0.5mg po @ 76212.1:1 CO for paranoia maintained.
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
## 2024-04-14 NOTE — ED PROVIDER NOTE - PROGRESS NOTE DETAILS
Lucinda Bertrand DO (PGY3): Pt repeatedly asking when she will see the psychiatrist. States she wants to call the police and call a laywer. States she knows the governor and will call them. States she wants to leave because she feels unsafe in ED, but cannot verbalize why she feels unsafe when asked. I was told by psychiatry that patient had 3 patients in front of her. At patient's request, I called back to check in and was told there were 2 patients in front of her. Pt keeps repeating that she was told she was next, however, pt was not told she was next, she was told there are 2 people in front of her. AN also called telepsych to check in and telepsych now states there is 1 patient in front of this patient. Pt pending telepsych eval. Yoni PGY3   Patient signed out to me at 7am pending psych evaluation. In summary 43F with history of anxiety presents with impulsive, erratic and paranoid behaviors. Telepsych evaluated patient overnight and was concerned for elliott vs substance-induced mood disorder. Patient is currently in the emergency department, on hold for psych reevaluation. Patient is requesting medication for anxiety and will give xanax. Yoni PGY3  /84 endorsed to me - accepted [facility] dr seo

## 2024-04-15 ENCOUNTER — INPATIENT (INPATIENT)
Facility: HOSPITAL | Age: 44
LOS: 2 days | Discharge: HOME | End: 2024-04-18
Attending: PSYCHIATRY & NEUROLOGY | Admitting: PSYCHIATRY & NEUROLOGY
Payer: MEDICAID

## 2024-04-15 VITALS — HEIGHT: 63 IN | WEIGHT: 139.99 LBS | TEMPERATURE: 98 F

## 2024-04-15 VITALS — SYSTOLIC BLOOD PRESSURE: 135 MMHG | DIASTOLIC BLOOD PRESSURE: 92 MMHG

## 2024-04-15 DIAGNOSIS — F39 UNSPECIFIED MOOD [AFFECTIVE] DISORDER: ICD-10-CM

## 2024-04-15 DIAGNOSIS — F19.10 OTHER PSYCHOACTIVE SUBSTANCE ABUSE, UNCOMPLICATED: ICD-10-CM

## 2024-04-15 LAB — TSH SERPL-MCNC: 1.82 UIU/ML — SIGNIFICANT CHANGE UP (ref 0.27–4.2)

## 2024-04-15 PROCEDURE — 81001 URINALYSIS AUTO W/SCOPE: CPT

## 2024-04-15 PROCEDURE — 90792 PSYCH DIAG EVAL W/MED SRVCS: CPT | Mod: 95

## 2024-04-15 PROCEDURE — 84702 CHORIONIC GONADOTROPIN TEST: CPT

## 2024-04-15 PROCEDURE — 93005 ELECTROCARDIOGRAM TRACING: CPT

## 2024-04-15 PROCEDURE — 70450 CT HEAD/BRAIN W/O DYE: CPT | Mod: MC

## 2024-04-15 PROCEDURE — 71046 X-RAY EXAM CHEST 2 VIEWS: CPT

## 2024-04-15 PROCEDURE — 80053 COMPREHEN METABOLIC PANEL: CPT

## 2024-04-15 PROCEDURE — 99285 EMERGENCY DEPT VISIT HI MDM: CPT | Mod: 25

## 2024-04-15 PROCEDURE — 99215 OFFICE O/P EST HI 40 MIN: CPT

## 2024-04-15 PROCEDURE — 85025 COMPLETE CBC W/AUTO DIFF WBC: CPT

## 2024-04-15 PROCEDURE — 80307 DRUG TEST PRSMV CHEM ANLYZR: CPT

## 2024-04-15 PROCEDURE — 84443 ASSAY THYROID STIM HORMONE: CPT

## 2024-04-15 PROCEDURE — 87637 SARSCOV2&INF A&B&RSV AMP PRB: CPT

## 2024-04-15 RX ORDER — ALPRAZOLAM 0.25 MG
0.5 TABLET ORAL ONCE
Refills: 0 | Status: DISCONTINUED | OUTPATIENT
Start: 2024-04-15 | End: 2024-04-15

## 2024-04-15 RX ORDER — AMLODIPINE BESYLATE 2.5 MG/1
10 TABLET ORAL DAILY
Refills: 0 | Status: DISCONTINUED | OUTPATIENT
Start: 2024-04-15 | End: 2024-04-18

## 2024-04-15 RX ORDER — OLANZAPINE 15 MG/1
5 TABLET, FILM COATED ORAL
Refills: 0 | Status: DISCONTINUED | OUTPATIENT
Start: 2024-04-15 | End: 2024-04-18

## 2024-04-15 RX ORDER — OLANZAPINE 15 MG/1
5 TABLET, FILM COATED ORAL ONCE
Refills: 0 | Status: DISCONTINUED | OUTPATIENT
Start: 2024-04-15 | End: 2024-04-18

## 2024-04-15 RX ORDER — INFLUENZA VIRUS VACCINE 15; 15; 15; 15 UG/.5ML; UG/.5ML; UG/.5ML; UG/.5ML
0.5 SUSPENSION INTRAMUSCULAR ONCE
Refills: 0 | Status: DISCONTINUED | OUTPATIENT
Start: 2024-04-15 | End: 2024-04-18

## 2024-04-15 RX ORDER — AMLODIPINE BESYLATE 2.5 MG/1
10 TABLET ORAL ONCE
Refills: 0 | Status: COMPLETED | OUTPATIENT
Start: 2024-04-15 | End: 2024-04-15

## 2024-04-15 RX ORDER — ALPRAZOLAM 0.25 MG
0.5 TABLET ORAL THREE TIMES A DAY
Refills: 0 | Status: DISCONTINUED | OUTPATIENT
Start: 2024-04-15 | End: 2024-04-18

## 2024-04-15 RX ORDER — ZOLPIDEM TARTRATE 10 MG/1
5 TABLET ORAL AT BEDTIME
Refills: 0 | Status: DISCONTINUED | OUTPATIENT
Start: 2024-04-15 | End: 2024-04-17

## 2024-04-15 RX ORDER — LOSARTAN POTASSIUM 100 MG/1
25 TABLET, FILM COATED ORAL DAILY
Refills: 0 | Status: DISCONTINUED | OUTPATIENT
Start: 2024-04-15 | End: 2024-04-18

## 2024-04-15 RX ADMIN — Medication 0.5 MILLIGRAM(S): at 20:14

## 2024-04-15 RX ADMIN — Medication 0.5 MILLIGRAM(S): at 23:26

## 2024-04-15 RX ADMIN — Medication 0.5 MILLIGRAM(S): at 15:56

## 2024-04-15 RX ADMIN — Medication 0.5 MILLIGRAM(S): at 08:56

## 2024-04-15 RX ADMIN — AMLODIPINE BESYLATE 10 MILLIGRAM(S): 2.5 TABLET ORAL at 20:14

## 2024-04-15 NOTE — ED BEHAVIORAL HEALTH PROGRESS NOTE - NEEDS CONTINUED MEDICAL WORKUP/TREATMENT
Rule out underlying medical condition as cause of psychiatric symptoms... Rule out underlying medical condition as cause of psychiatric symptoms.../Other... Other...

## 2024-04-15 NOTE — BH INPATIENT PSYCHIATRY ASSESSMENT NOTE - NSBHCHARTREVIEWVS_PSY_A_CORE FT
Vital Signs Last 24 Hrs  T(C): 36.8 (04-15-24 @ 19:30), Max: 36.8 (04-15-24 @ 01:00)  T(F): 98.3 (04-15-24 @ 19:30), Max: 98.3 (04-15-24 @ 01:00)  HR: 103 (04-15-24 @ 19:30) (98 - 114)  BP: 135/92 (04-15-24 @ 20:14) (135/84 - 166/133)  BP(mean): 144 (04-15-24 @ 19:30) (117 - 144)  RR: 20 (04-15-24 @ 19:30) (16 - 20)  SpO2: 98% (04-15-24 @ 19:30) (98% - 99%)

## 2024-04-15 NOTE — BH INPATIENT PSYCHIATRY ASSESSMENT NOTE - NSBHASSESSSUMMFT_PSY_ALL_CORE
Patient is a 43-year-old female with a history of anxiety brought in by police after calling 911 because a DoorDash  would not leave her porch. The patient was found to be manic, exhibiting tangential thought process and pressured speech. Her urine drug screen was positive for THC and amphetamines. Upon admission, the patient reports paranoia surrounding her boyfriend and ex-boyfriend but appears less manic compared to her initial presentation. The differential includes substance-induced bipolar disorder and bipolar 1 disorder. Given her recent behavior, she is a danger to herself and therefore meets criteria for inpatient psychiatric hospitalization.    1. Admission status  9.27 (Involuntary admission)    2. Significant lab findings  UDS positive for THC and amphetamines  ***Please inquire about Adderall prescribed by Dr. Valenzuela    3. Psychotropic medication  - Alprazolam 0.25 mg TID PRN for anxiety  - Zolpidem 10 mg QHS PRN for insomnia    Agitation PRNs: Olanzapine PO/IM    4. Medical conditions, other medication, consults  A) HTN  - Amlodipine 10 mg daily  - Losartan 25 mg daily (to replace home olmesartan 10 mg daily)    5. Psychosocial interventions  - Patient provided verbal consent to speak with TBD  - CO 1:1: Not required  - Restrictions/allowances: None

## 2024-04-15 NOTE — BH INPATIENT PSYCHIATRY ASSESSMENT NOTE - NSBHMETABOLIC_PSY_ALL_CORE_FT
BMI: BMI (kg/m2): 23.9 (04-14-24 @ 20:40)  HbA1c:   Glucose:   BP: --Vital Signs Last 24 Hrs  T(C): 36.8 (04-15-24 @ 19:30), Max: 36.8 (04-15-24 @ 01:00)  T(F): 98.3 (04-15-24 @ 19:30), Max: 98.3 (04-15-24 @ 01:00)  HR: 103 (04-15-24 @ 19:30) (98 - 114)  BP: 135/92 (04-15-24 @ 20:14) (135/84 - 166/133)  BP(mean): 144 (04-15-24 @ 19:30) (117 - 144)  RR: 20 (04-15-24 @ 19:30) (16 - 20)  SpO2: 98% (04-15-24 @ 19:30) (98% - 99%)      Lipid Panel:
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
## 2024-04-15 NOTE — ED BEHAVIORAL HEALTH NOTE - BEHAVIORAL HEALTH NOTE
LMSW attempted to obtain collateral from mother (Coleen Prince, [phone]). Collateral reports she has not spoken to pt since last summer and has no information regarding pt's recent functioning. Attending aware of attempt.

## 2024-04-15 NOTE — ED BEHAVIORAL HEALTH PROGRESS NOTE - RISK ASSESSMENT
Risk factors: single, prior substance abuse history, +amphetamine and cannabis  Protective factors: caregiver to her son, no prior SI or SA

## 2024-04-15 NOTE — CHART NOTE - NSCHARTNOTEFT_GEN_A_CORE
EMERGENCY : SW consulted by psychiatry as patient in need of inpatient involuntary psychiatric placement. As per ED MD, patient medically cleared for inpatient psychiatric transfer. LCSW reviewed patient's chart. As per chart review patient is a "44 y/o F,  English-speaking, domiciled in a private residence, single, has 13 y/o child, unemployed with a pmhx of HTN,seizure and a pphx of anxiety,Substance use disorder, previsouly was on Ambien, Zanax, lamotrigine,  presenting to the ED after calling the police on her door-dasher because she was suspicious of his loitering." Involuntary legals completed and present in chart. LCSW spoke with patient's step-father Eric PH: [phone] as patient endorses having a 12 year old child. As per Eric patient's child is safe and with his father, he states no safety concerns for patient's son. LCSW conducted bed search.    LCSW spoke with Reyna from [facility] at [facility] who indicates bed availability for patient. Legals and ekg sent as requested. As per Reyna, patient accepted to [address] with Dr. Mendelowitz as the accepting doctor. LCSW made ED MD, ED RN and psychiatry aware. Patient also made aware. Transfer packet created with legals, ekg, nonemergent,  assessment, face sheet, and transportation forms. Packet placed in chart RN aware. LCSW arranged ambulance transportation with [facility], trip on holding pending RN handoff to [facility] and [address]. RN aware to provide handoff to both EMS and [facility]. Telepsych email sent. ED MD to complete transfer dispo in [place]. Patient with [employer] requiring insurance authorization. SW to follow up with authorization and remain available as needed.

## 2024-04-15 NOTE — BH INPATIENT PSYCHIATRY ASSESSMENT NOTE - CURRENT MEDICATION
Tested positive for covid on      2/15/24  went to [facility]   got  molnupiravir and benzonatate   recovered okay now  3/1/24    Right breast  cancer  //   Lumpectomy  dr zimmerman   feb 22 2022   With axilla lymph node    Negative node   has taken chemo and radiation.  Now on letrozole.  Following with dr fox   3-1-24       Peripheral neuropathy   1-30-23   seen dr fox   //  neuropathy secondary  to chemo //   On gabapentin  more steady on feet   reasonable accommodation   work from home //  finished  physical therapy  3-1-24    Sinus better at this time    3-1-24     covid vaccine  has had //       See in three with   all the meds     MEDICATIONS  (STANDING):  amLODIPine   Tablet 10 milliGRAM(s) Oral daily  losartan 25 milliGRAM(s) Oral daily    MEDICATIONS  (PRN):  ALPRAZolam 0.5 milliGRAM(s) Oral three times a day PRN anxiety  OLANZapine 5 milliGRAM(s) Oral four times a day PRN agitation  OLANZapine Injectable 5 milliGRAM(s) IntraMuscular once PRN severe agitation  zolpidem 5 milliGRAM(s) Oral at bedtime PRN Insomnia  zolpidem 5 milliGRAM(s) Oral at bedtime PRN Insomnia

## 2024-04-15 NOTE — CHART NOTE - NSCHARTNOTEFT_GEN_A_CORE
Screening Medical Evaluation    Patient Admitted from: [facility] ED    [facility] admitting diagnosis: Mood disorder      PAST MEDICAL & SURGICAL HISTORY:  History of seizure disorder  Hypertension  No significant past surgical history    ALLERGIES:  No Known Allergies    SOCIAL HISTORY:  Patient states she drinks alcohol (1-2 glasses of wine) 1x/week. She denied any substance use today, but then said she smoked marijuana yesterday. UTOX positive for amphetamine. No other affective or psychotic symptoms reported.    FAMILY HISTORY:  No known pertinent family history in 1st degree relatives      MEDICATIONS  (STANDING):  amLODIPine   Tablet 10 milliGRAM(s) Oral daily  losartan 25 milliGRAM(s) Oral daily    MEDICATIONS  (PRN):  ALPRAZolam 0.5 milliGRAM(s) Oral three times a day PRN anxiety  OLANZapine 5 milliGRAM(s) Oral four times a day PRN agitation  OLANZapine Injectable 5 milliGRAM(s) IntraMuscular once PRN severe agitation  zolpidem 5 milliGRAM(s) Oral at bedtime PRN Insomnia  zolpidem 5 milliGRAM(s) Oral at bedtime PRN Insomnia      Vital Signs Last 24 Hrs  T(C): 36.7 (15 Apr 2024 21:54), Max: 36.8 (15 Apr 2024 01:00)  T(F): 98 (15 Apr 2024 21:54), Max: 98.3 (15 Apr 2024 01:00)  HR: 103 (15 Apr 2024 19:30) (98 - 114)  BP: 135/92 (15 Apr 2024 20:14) (135/84 - 166/133)  BP(mean): 144 (15 Apr 2024 19:30) (117 - 144)  RR: 20 (15 Apr 2024 19:30) (16 - 20)  SpO2: 98% (15 Apr 2024 19:30) (98% - 99%)      PHYSICAL EXAM:  GENERAL: NAD  HEAD:  Atraumatic, Normocephalic  EYES: EOMI, PERRLA, conjunctiva and sclera clear  NECK: Supple, No JVD  CHEST/LUNG: Clear to auscultation bilaterally; No wheeze  HEART: Regular rate and rhythm; No murmurs, rubs, or gallops  NEUROLOGY: non-focal  SKIN: No rashes or lesions      LABS:                        13.9   9.83  )-----------( 381      ( 2024 20:54 )             42.4     04-14    137  |  102  |  11  ----------------------------<  111<H>  3.4<L>   |  23  |  0.56    Ca    9.5      2024 20:54    TPro  8.1  /  Alb  4.8  /  TBili  0.3  /  DBili  x   /  AST  19  /  ALT  23  /  AlkPhos  64        Urinalysis Basic - ( 2024 20:55 )  Color: Yellow / Appearance: Clear / S.010 / pH: x  Gluc: x / Ketone: Negative mg/dL  / Bili: Negative / Urobili: 0.2 mg/dL   Blood: x / Protein: Negative mg/dL / Nitrite: Negative   Leuk Esterase: Negative / RBC: 3 /HPF / WBC 3 /HPF   Sq Epi: x / Non Sq Epi: 11 /HPF / Bacteria: Few /HPF      Assessment and Plan:  43F admitted to [facility] for mood disorder w/ PMHx of HTN, hx of seizures (last seizure was 2 years ago, neuro work up including EEG negative at that time) seen at bedside for medical screening evaluation. Patient has no acute medical complaints at this time. Patient denies fever, chills, headache, dizziness, lightheadedness, N/V, SOB, cough, congestion, chest pain, abdominal pain, dysuria, hematuria, diarrhea, constipation. Physical exam unremarkable, VSS. Labs WNL. UA + for THC and amphetamines. EKG sinus tachy, Qtc 436.     1.) Mood disorder: Refer to primary team documentation for recs  2.) HTN: Amlodipine 10mg QD, losartan 25mg QD (to replace home olmesartan 10mg QD). Monitor BP

## 2024-04-15 NOTE — ED ADULT NURSE REASSESSMENT NOTE - NS ED NURSE REASSESS COMMENT FT1
pt. was interviewed in a private room via Telepsych w/ 1:1 staff in the room. thought process remains tangential and illogical.

## 2024-04-15 NOTE — ED BEHAVIORAL HEALTH ASSESSMENT NOTE - SUMMARY
Patient is a 42 y/o English-speaking F, domiciled, single, has 13 y/o child, unclear employment with a pmhx of HTN and a pphx of anxiety presenting to the ED after calling the police on her door-dasher because she was suspicious of his loitering.    Patient is pressured and tangential on evaluation and difficult to obtain reliable history. Patient does appear paranoid, and this is leading to erratic behaviors like calling the police on the . Patient has no history of hospitalizations and does not report history of elloitt, but psykes shows she was prescribed lamotrigine prior. Of note patient's UTOX was also positive for cannabis and amphetamine. Patient also had elevated HR and BP on arrival which can also be indicative of stimulant-misuse. Patient denied any symptoms of acute dangerousness but considering her impulsive, erratic and paranoid behaviors further collateral is necessary for dispo planning. If this current presentation is substance-induced, monitoring in the ED can also lead to potentially symptom resolution.     Plan:  - Hold for collateral and substance metabolism

## 2024-04-15 NOTE — ED ADULT NURSE REASSESSMENT NOTE - NS ED NURSE REASSESS COMMENT FT1
Belongings and valuables returned to patients brother. #[id number] and #[phone] Belongings and valuables returned to patients brother Mike Sandoval. #[phone] and #[phone]

## 2024-04-15 NOTE — BH INPATIENT PSYCHIATRY ASSESSMENT NOTE - NSBHMSEREMMEM_PSY_A_CORE
Infant alert with care, moving all extremities well. VSS. [place] soft and flat. Voiding and stooling appropriately. Mother struggling with latching infant without nipple shield. When asked if she notices any BM when infant come off shield mother replies \"not sure\". Mother hoping to go home today with infant. Mother aware that infant hasnt had a void in 20+ hours. Parents questioned regarding how they will continue to feed baby once they are discharged mother responded Newt Hopping will continue to give formula along with attempting to latch and pump. Mother informed that lactation will work with them to have a feeding plan. Parents agree. Bonding well with parents. Will continue to monitor. Normal

## 2024-04-15 NOTE — BH PATIENT PROFILE - HOME MEDICATIONS
naproxen 500 mg oral tablet , 1 tab(s) orally 2 times a day   amoxicillin-clavulanate 875 mg-125 mg oral tablet , 1 tab(s) orally 2 times a day

## 2024-04-15 NOTE — ED BEHAVIORAL HEALTH ASSESSMENT NOTE - PSYCHIATRIC ISSUES AND PLAN (INCLUDE STANDING AND PRN MEDICATION)
if agitated can give haldol 5mg po q6hr and if anxious can give ativan 2mg po q6hrs in severe agitation can give both IM

## 2024-04-15 NOTE — ED BEHAVIORAL HEALTH PROGRESS NOTE - CASE SUMMARY/FORMULATION (CLEARLY DOCUMENT RATIONALE FOR DISPOSITION CHANGE)
Patient is a 44 y/o F,  English-speaking, domiciled in a private residence, single, has 13 y/o child, unemployed with a pmhx of HTN,seizure and a pphx of anxiety,Substance use disorder, previsouly was on Ambien, Zanax, lamotrigine,  presenting to the ED after calling the police on her door-dasher because she was suspicious of his loitering.    Patient is pressured and presenting rumination on evaluation and difficult to obtain reliable history. Pt's mother and stepfather endorse that the pt has been acting differently for the last few years, being irritable, talks fast, and verbally abusive. Patient's UTOX was also positive for cannabis and amphetamine.  Patient also had elevated HR and BP on arrival which can also be indicative of stimulant-misuse. The patient denied any symptoms of acute dangerousness, but considering her impulsive, erratic, and paranoid behaviors, it is concerning for her safety.     Plan:  -Hold in the ED for bed availability and supportive care  Patient is a 42 y/o F,  English-speaking, domiciled in a private residence, single, has 13 y/o child, unemployed with a pmhx of HTN,seizure and a pphx of anxiety,Substance use disorder, previsouly was on Ambien, Zanax, lamotrigine,  presenting to the ED after calling the police on her door-dasher because she was suspicious of his loitering.    Patient is pressured and presenting rumination on evaluation and difficult to obtain reliable history. Pt's mother and stepfather endorse that the pt has been acting differently for the last few years, being irritable, talks fast, and verbally abusive. Patient's UTOX was also positive for cannabis and amphetamine.  Patient also had elevated HR and BP on arrival which can also be indicative of stimulant-misuse. The patient denied any symptoms of acute dangerousness, but considering her impulsive, erratic, and paranoid behaviors, it is concerning for her safety.  Requires 2PC to inpt psych.
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
## 2024-04-15 NOTE — ED ADULT NURSE REASSESSMENT NOTE - NS ED NURSE REASSESS COMMENT FT1
pt. remains fixated w/ police report, asking different staff how come there was no police report why she was brought to ED, preoccupied w/ calling a  and suing everybody why there was no police report. she insisted that " I called 911 for police help, but not for mental. i'm not crazy". pt. is tangential and illogical @ times. 1:1 CO for paranoia maintained.
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
## 2024-04-16 RX ORDER — LORATADINE 10 MG/1
10 TABLET ORAL DAILY
Refills: 0 | Status: DISCONTINUED | OUTPATIENT
Start: 2024-04-16 | End: 2024-04-18

## 2024-04-16 RX ADMIN — LORATADINE 10 MILLIGRAM(S): 10 TABLET ORAL at 14:13

## 2024-04-16 RX ADMIN — ZOLPIDEM TARTRATE 5 MILLIGRAM(S): 10 TABLET ORAL at 23:46

## 2024-04-16 RX ADMIN — Medication 0.5 MILLIGRAM(S): at 22:13

## 2024-04-16 RX ADMIN — Medication 0.5 MILLIGRAM(S): at 14:15

## 2024-04-16 RX ADMIN — AMLODIPINE BESYLATE 10 MILLIGRAM(S): 2.5 TABLET ORAL at 08:13

## 2024-04-16 RX ADMIN — LOSARTAN POTASSIUM 25 MILLIGRAM(S): 100 TABLET, FILM COATED ORAL at 08:13

## 2024-04-16 RX ADMIN — Medication 200 MILLIGRAM(S): at 22:42

## 2024-04-16 RX ADMIN — ZOLPIDEM TARTRATE 5 MILLIGRAM(S): 10 TABLET ORAL at 00:21

## 2024-04-16 NOTE — BH INPATIENT PSYCHIATRY PROGRESS NOTE - NSBHMETABOLIC_PSY_ALL_CORE_FT
BMI: BMI (kg/m2): 24.8 (04-15-24 @ 21:54)  HbA1c:   Glucose:   BP: --Vital Signs Last 24 Hrs  T(C): 36.3 (04-16-24 @ 08:16), Max: 36.8 (04-15-24 @ 19:30)  T(F): 97.4 (04-16-24 @ 08:16), Max: 98.3 (04-15-24 @ 19:30)  HR: 103 (04-15-24 @ 19:30) (103 - 110)  BP: 135/92 (04-15-24 @ 20:14) (135/92 - 166/133)  BP(mean): 144 (04-15-24 @ 19:30) (144 - 144)  RR: 20 (04-15-24 @ 19:30) (16 - 20)  SpO2: 98% (04-15-24 @ 19:30) (98% - 99%)    Orthostatic VS  04-16-24 @ 08:16  Lying BP: --/-- HR: --  Sitting BP: 132/96 HR: 84  Standing BP: 128/99 HR: 94  Site: --  Mode: --  Orthostatic VS  04-15-24 @ 21:54  Lying BP: --/-- HR: --  Sitting BP: 138/79 HR: 88  Standing BP: 144/84 HR: 90  Site: --  Mode: --    Lipid Panel:  BMI: BMI (kg/m2): 24.8 (04-15-24 @ 21:54)  HbA1c:   Glucose:   BP: --Vital Signs Last 24 Hrs  T(C): 36.4 (04-17-24 @ 08:41), Max: 36.6 (04-16-24 @ 19:17)  T(F): 97.6 (04-17-24 @ 08:41), Max: 97.9 (04-16-24 @ 19:17)  HR: --  BP: --  BP(mean): --  RR: --  SpO2: --    Orthostatic VS  04-17-24 @ 08:41  Lying BP: --/-- HR: --  Sitting BP: 127/87 HR: 90  Standing BP: 128/99 HR: 97  Site: --  Mode: --  Orthostatic VS  04-16-24 @ 19:17  Lying BP: --/-- HR: --  Sitting BP: 117/82 HR: 96  Standing BP: 124/84 HR: 100  Site: --  Mode: --  Orthostatic VS  04-16-24 @ 08:16  Lying BP: --/-- HR: --  Sitting BP: 132/96 HR: 84  Standing BP: 128/99 HR: 94  Site: --  Mode: --  Orthostatic VS  04-15-24 @ 21:54  Lying BP: --/-- HR: --  Sitting BP: 138/79 HR: 88  Standing BP: 144/84 HR: 90  Site: --  Mode: --    Lipid Panel:

## 2024-04-16 NOTE — BH INPATIENT PSYCHIATRY PROGRESS NOTE - OTHER
Unclear if thoughts around paranoia are reality-based pending collateral from brother and psychiatrist

## 2024-04-16 NOTE — BH INPATIENT PSYCHIATRY PROGRESS NOTE - NSBHCHARTREVIEWLAB_PSY_A_CORE FT
LABS:                      13.9   9.83  )-----------( 381      ( 2024 20:54 )             42.4     137  |  102  |  11  ----------------------------<  111<H>  3.4<L>   |  23  |  0.56    Ca    9.5      2024 20:54  TPro  8.1  /  Alb  4.8  /  TBili  0.3  /  DBili  x   /  AST  19  /  ALT  23  /  AlkPhos  64  04-14  LIVER FUNCTIONS - ( 2024 20:54 )  Alb: 4.8 g/dL / Pro: 8.1 g/dL / ALK PHOS: 64 U/L / ALT: 23 U/L / AST: 19 U/L / GGT: x           Urinalysis Basic - ( 2024 20:55 )  Color: Yellow / Appearance: Clear / S.010 / pH: x  Gluc: x / Ketone: Negative mg/dL  / Bili: Negative / Urobili: 0.2 mg/dL   Blood: x / Protein: Negative mg/dL / Nitrite: Negative   Leuk Esterase: Negative / RBC: 3 /HPF / WBC 3 /HPF   Sq Epi: x / Non Sq Epi: 11 /HPF / Bacteria: Few /HPF

## 2024-04-16 NOTE — BH INPATIENT PSYCHIATRY PROGRESS NOTE - PRN MEDS
MEDICATIONS  (PRN):  ALPRAZolam 0.5 milliGRAM(s) Oral three times a day PRN anxiety  OLANZapine 5 milliGRAM(s) Oral four times a day PRN agitation  OLANZapine Injectable 5 milliGRAM(s) IntraMuscular once PRN severe agitation  zolpidem 5 milliGRAM(s) Oral at bedtime PRN Insomnia  zolpidem 5 milliGRAM(s) Oral at bedtime PRN Insomnia   MEDICATIONS  (PRN):  ALPRAZolam 0.5 milliGRAM(s) Oral three times a day PRN anxiety  guaiFENesin Oral Liquid (Sugar-Free) 100 milliGRAM(s) Oral every 6 hours PRN cough  loratadine 10 milliGRAM(s) Oral daily PRN seasonal allergy  OLANZapine 5 milliGRAM(s) Oral four times a day PRN agitation  OLANZapine Injectable 5 milliGRAM(s) IntraMuscular once PRN severe agitation  zolpidem 10 milliGRAM(s) Oral at bedtime PRN Insomnia

## 2024-04-16 NOTE — PSYCHIATRIC REHAB INITIAL EVALUATION - NSBHALCSUBTREAT_PSY_ALL_CORE
Pt reports having therapist and primary care doctor, who has been prescribing her Xanax. No history of hospitalization.
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
## 2024-04-16 NOTE — BH SOCIAL WORK INITIAL PSYCHOSOCIAL EVALUATION - NSBHTREATHX_PSY_ALL_CORE
Unclear from EMR. Pt was in bed and wanted to meet with writer another time./Unable to answer (specify)

## 2024-04-16 NOTE — BH INPATIENT PSYCHIATRY PROGRESS NOTE - NSBHCHARTREVIEWINVESTIGATE_PSY_A_CORE FT
EKG: < from: 12 Lead ECG (04.14.24 @ 22:22) >  Diagnosis Line SINUS TACHYCARDIA  POSSIBLE LEFT ATRIAL ENLARGEMENT    X-ray chest: < from: Xray Chest 2 Views PA/Lat (04.14.24 @ 21:51) >  IMPRESSION:  Clear lungs.    CT head non-contast < from: CT Head No Cont (04.14.24 @ 21:57) >  Impression: No acute hemorrhage or mass effect.

## 2024-04-16 NOTE — BH SOCIAL WORK INITIAL PSYCHOSOCIAL EVALUATION - NSBHCHILDRESP_PSY_ALL_CORE
No Pt's son resides with his father.  Unclear of visitation at this time./Unable to answer (specify)

## 2024-04-16 NOTE — BH INPATIENT PSYCHIATRY PROGRESS NOTE - CURRENT MEDICATION
MEDICATIONS  (STANDING):  amLODIPine   Tablet 10 milliGRAM(s) Oral daily  influenza   Vaccine 0.5 milliLiter(s) IntraMuscular once  losartan 25 milliGRAM(s) Oral daily    MEDICATIONS  (PRN):  ALPRAZolam 0.5 milliGRAM(s) Oral three times a day PRN anxiety  OLANZapine 5 milliGRAM(s) Oral four times a day PRN agitation  OLANZapine Injectable 5 milliGRAM(s) IntraMuscular once PRN severe agitation  zolpidem 5 milliGRAM(s) Oral at bedtime PRN Insomnia  zolpidem 5 milliGRAM(s) Oral at bedtime PRN Insomnia   MEDICATIONS  (STANDING):  amLODIPine   Tablet 10 milliGRAM(s) Oral daily  influenza   Vaccine 0.5 milliLiter(s) IntraMuscular once  losartan 25 milliGRAM(s) Oral daily    MEDICATIONS  (PRN):  ALPRAZolam 0.5 milliGRAM(s) Oral three times a day PRN anxiety  guaiFENesin Oral Liquid (Sugar-Free) 100 milliGRAM(s) Oral every 6 hours PRN cough  loratadine 10 milliGRAM(s) Oral daily PRN seasonal allergy  OLANZapine 5 milliGRAM(s) Oral four times a day PRN agitation  OLANZapine Injectable 5 milliGRAM(s) IntraMuscular once PRN severe agitation  zolpidem 10 milliGRAM(s) Oral at bedtime PRN Insomnia

## 2024-04-16 NOTE — BH INPATIENT PSYCHIATRY PROGRESS NOTE - NSBHMSEJUDGE_PSY_A_CORE
78 year old with PMHx HTN, HLD, Mobitz 1 HB presented to cardiologist with episodes of palpitation and  dizziness at rest. Holter revealed multiple PVCs and  ventricular bigeminy. Nuclear ST in January was negative for ischemia. Due to recent symptoms and and multiple PVCs on holter pt was referred for cardiac cath and possible PCI. 78 year old male with PMHx HTN, HLD, Mobitz 1 HB presented to cardiologist with episodes of palpitation and  dizziness at rest. Holter revealed multiple PVCs and  ventricular bigeminy. Nuclear ST in January was negative for ischemia. Due to recent symptoms and multiple PVCs on holter pt was referred for cardiac cath and possible PCI. 78 year old male with PMHx HTN, HLD, Mobitz 1 HB presented to cardiologist with episodes of palpitation and light headedness at rest. Holter revealed multiple PVCs and  ventricular bigeminy. Nuclear ST in January was negative for ischemia. Due to recent symptoms and multiple PVCs on holter pt was referred for cardiac cath and possible PCI. Fair
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
## 2024-04-16 NOTE — BH INPATIENT PSYCHIATRY PROGRESS NOTE - NSBHCHARTREVIEWVS_PSY_A_CORE FT
Vital Signs Last 24 Hrs  T(C): 36.3 (04-16-24 @ 08:16), Max: 36.8 (04-15-24 @ 19:30)  T(F): 97.4 (04-16-24 @ 08:16), Max: 98.3 (04-15-24 @ 19:30)  HR: 103 (04-15-24 @ 19:30) (103 - 110)  BP: 135/92 (04-15-24 @ 20:14) (135/92 - 166/133)  BP(mean): 144 (04-15-24 @ 19:30) (144 - 144)  RR: 20 (04-15-24 @ 19:30) (16 - 20)  SpO2: 98% (04-15-24 @ 19:30) (98% - 99%)    Orthostatic VS  04-16-24 @ 08:16  Lying BP: --/-- HR: --  Sitting BP: 132/96 HR: 84  Standing BP: 128/99 HR: 94  Site: --  Mode: --  Orthostatic VS  04-15-24 @ 21:54  Lying BP: --/-- HR: --  Sitting BP: 138/79 HR: 88  Standing BP: 144/84 HR: 90  Site: --  Mode: --   Vital Signs Last 24 Hrs  T(C): 36.4 (04-17-24 @ 08:41), Max: 36.6 (04-16-24 @ 19:17)  T(F): 97.6 (04-17-24 @ 08:41), Max: 97.9 (04-16-24 @ 19:17)  HR: --  BP: --  BP(mean): --  RR: --  SpO2: --    Orthostatic VS  04-17-24 @ 08:41  Lying BP: --/-- HR: --  Sitting BP: 127/87 HR: 90  Standing BP: 128/99 HR: 97  Site: --  Mode: --  Orthostatic VS  04-16-24 @ 19:17  Lying BP: --/-- HR: --  Sitting BP: 117/82 HR: 96  Standing BP: 124/84 HR: 100  Site: --  Mode: --  Orthostatic VS  04-16-24 @ 08:16  Lying BP: --/-- HR: --  Sitting BP: 132/96 HR: 84  Standing BP: 128/99 HR: 94  Site: --  Mode: --  Orthostatic VS  04-15-24 @ 21:54  Lying BP: --/-- HR: --  Sitting BP: 138/79 HR: 88  Standing BP: 144/84 HR: 90  Site: --  Mode: --
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
## 2024-04-16 NOTE — BH INPATIENT PSYCHIATRY PROGRESS NOTE - NSBHASSESSSUMMFT_PSY_ALL_CORE
Patient is a 43-year-old female with a history of anxiety brought in by police after calling 911 because a [employer]ash  would not leave her porch. The patient was found to be manic, exhibiting tangential thought process and pressured speech. Her urine drug screen was positive for THC and amphetamines. Upon admission, the patient reports paranoia surrounding her boyfriend and ex-boyfriend but appears less manic compared to her initial presentation. The differential includes substance-induced bipolar disorder and bipolar 1 disorder. Given her recent behavior, she is a danger to herself and therefore meets criteria for inpatient psychiatric hospitalization.    1. Admission status  9.27 (Involuntary admission)    2. Significant lab findings  UDS positive for THC and amphetamines  ***Please inquire about Adderall prescribed by Dr. Valenzuela    3. Psychotropic medication  - Alprazolam 0.25 mg TID PRN for anxiety  - Zolpidem 10 mg QHS PRN for insomnia    Agitation PRNs: Olanzapine PO/IM    4. Medical conditions, other medication, consults  A) HTN  - Amlodipine 10 mg daily  - Losartan 25 mg daily (to replace home olmesartan 10 mg daily)    5. Psychosocial interventions  - Patient provided verbal consent to speak with TBD  - CO 1:1: Not required  - Restrictions/allowances: None   Patient is a 43-year-old female with a history of anxiety brought in by police after calling 911 because a [employer]ash  would not leave her porch. The patient was found to be manic, exhibiting tangential thought process and pressured speech. Her urine drug screen was positive for THC and amphetamines. Upon admission, the patient reports paranoia surrounding her boyfriend and ex-boyfriend but appears less manic compared to her initial presentation. The differential includes substance-induced bipolar disorder and bipolar 1 disorder. Given her recent behavior, she is a danger to herself and therefore meets criteria for inpatient psychiatric hospitalization.    On discussion today patient is grossly less manic, still with pressured speech and hyperverbality, but overall linear thought process. She expresses paranoia surrounding ex-boyfriend and Do[employer] , and is labile on interview intermittently crying when discussing her son. Suspect resolving manic episode or substance induced psychosis.    Plan:  - F/u with outpatient psych Dr. Valenzuela re: Adderall prescription  - Ativan PRN for anxiety  - D/c alprazolam 0.25 mg TID PRN for anxiety  - HTN: Amlodipine 10 mg daily, Losartan 25 mg daily (to replace home olmesartan 10 mg daily)  - PRNs: Olanzepine 5 mg PO and IM PRN agitation, Ambien 5 mg PO qHS PRN insomnia

## 2024-04-17 DIAGNOSIS — F23 BRIEF PSYCHOTIC DISORDER: ICD-10-CM

## 2024-04-17 PROCEDURE — 99232 SBSQ HOSP IP/OBS MODERATE 35: CPT | Mod: GC

## 2024-04-17 RX ORDER — ZOLPIDEM TARTRATE 10 MG/1
10 TABLET ORAL AT BEDTIME
Refills: 0 | Status: DISCONTINUED | OUTPATIENT
Start: 2024-04-17 | End: 2024-04-18

## 2024-04-17 RX ORDER — IBUPROFEN 200 MG
400 TABLET ORAL ONCE
Refills: 0 | Status: COMPLETED | OUTPATIENT
Start: 2024-04-17 | End: 2024-04-17

## 2024-04-17 RX ADMIN — AMLODIPINE BESYLATE 10 MILLIGRAM(S): 2.5 TABLET ORAL at 08:49

## 2024-04-17 RX ADMIN — LORATADINE 10 MILLIGRAM(S): 10 TABLET ORAL at 11:08

## 2024-04-17 RX ADMIN — Medication 400 MILLIGRAM(S): at 20:18

## 2024-04-17 RX ADMIN — ZOLPIDEM TARTRATE 5 MILLIGRAM(S): 10 TABLET ORAL at 00:45

## 2024-04-17 RX ADMIN — LOSARTAN POTASSIUM 25 MILLIGRAM(S): 100 TABLET, FILM COATED ORAL at 08:49

## 2024-04-17 RX ADMIN — Medication 0.5 MILLIGRAM(S): at 12:57

## 2024-04-17 RX ADMIN — Medication 0.5 MILLIGRAM(S): at 21:38

## 2024-04-17 NOTE — BH INPATIENT PSYCHIATRY PROGRESS NOTE - NSTXANXDATEEST_PSY_ALL_CORE
"Subjective:       Patient ID: Herber Cobian 
15-Apr-2024
15-Apr-2024

## 2024-04-17 NOTE — BH INPATIENT PSYCHIATRY PROGRESS NOTE - CURRENT MEDICATION
MEDICATIONS  (STANDING):  amLODIPine   Tablet 10 milliGRAM(s) Oral daily  influenza   Vaccine 0.5 milliLiter(s) IntraMuscular once  losartan 25 milliGRAM(s) Oral daily    MEDICATIONS  (PRN):  ALPRAZolam 0.5 milliGRAM(s) Oral three times a day PRN anxiety  guaiFENesin Oral Liquid (Sugar-Free) 100 milliGRAM(s) Oral every 6 hours PRN cough  loratadine 10 milliGRAM(s) Oral daily PRN seasonal allergy  OLANZapine 5 milliGRAM(s) Oral four times a day PRN agitation  OLANZapine Injectable 5 milliGRAM(s) IntraMuscular once PRN severe agitation  zolpidem 10 milliGRAM(s) Oral at bedtime PRN Insomnia

## 2024-04-17 NOTE — BH INPATIENT PSYCHIATRY PROGRESS NOTE - MSE UNSTRUCTURED FT
alert, oriented. with good hygeine and grooming.  Cooperative.    Maintains good eye contact but overall good relatability.   No evidence of psychomotor activation or retardation. No abnormal movement. Gait steady.   Speech is notable for somewhat increased productivity but otherwise normal prosody, volume, and articulation.   Mood is "better"  Affect is neutral, stable. Mood congrent.   Thought process is linear without signs of disoragnization. Normal thought assocaition.  Thought content unremarkable. Denies SIIP. No evidence of delusions.   Denies perceptual disturbance including A/VH.   Judgement fair. Insight fair.   Normal impulse control.  alert, oriented. with good hygiene and grooming.  Cooperative.    Maintains good eye contact but overall good relatedness.   No evidence of psychomotor activation or retardation. No abnormal movement. Gait steady.   Speech is notable for somewhat increased productivity but otherwise normal prosody, volume, and articulation.   Mood is "better"  Affect is neutral, stable. Mood congruent.   Thought process is linear without signs of disorganization. Normal thought association.  Thought content unremarkable. Denies SIIP. No evidence of delusions.   Denies perceptual disturbance including A/VH.   Judgement fair. Insight fair.   Normal impulse control.

## 2024-04-17 NOTE — BH INPATIENT PSYCHIATRY PROGRESS NOTE - NSBHFUPINTERVALHXFT_PSY_A_CORE
Chart reviewed. Case d/w interdisciplinary team. Patient seen and examined. No acute overnight events, no PRNs required. Asked for home PRN xanax 0.5mg twice. She does not have any standing psychotropic medications. Ongoing itchy, dry cough--patient took claritin and robitussin yesterday.     Reports tossing and turning last night with 5mg of ambien, which is half the dose of what she normally takes at home. Upon further conversation, it was revealed that she also drank caffeinated tea in the evening which could have contributed to her difficulty staying asleep. Still feels that her head is clearer and has been able to reflect back on the events that led her to the hospital. prior to admission, admits to dealing with incredibly stressful situation all alone for a week without asking for any help. Pt says she has a tendency be independent and think she can handle things on her own. In retrospect, she is glad that she is getting some reprieve from the situation and to have a chance to clear her head away from stressors.     She reports "good" mood. Denies flights of ideas or grandiose delusions. She is advocating for discharge as she feels back at baseline and would like to return home to her 12 year old son.

## 2024-04-17 NOTE — BH INPATIENT PSYCHIATRY PROGRESS NOTE - NSTXPSYCHOGOALOTHER_PSY_ALL_CORE
Identify two to three symptoms of current episode.
Identify two to three symptoms of current episode.

## 2024-04-17 NOTE — BH INPATIENT PSYCHIATRY PROGRESS NOTE - NSBHMETABOLIC_PSY_ALL_CORE_FT
BMI: BMI (kg/m2): 24.8 (04-15-24 @ 21:54)  HbA1c:   Glucose:   BP: --Vital Signs Last 24 Hrs  T(C): 36.4 (04-17-24 @ 08:41), Max: 36.6 (04-16-24 @ 19:17)  T(F): 97.6 (04-17-24 @ 08:41), Max: 97.9 (04-16-24 @ 19:17)  HR: --  BP: --  BP(mean): --  RR: --  SpO2: --    Orthostatic VS  04-17-24 @ 08:41  Lying BP: --/-- HR: --  Sitting BP: 127/87 HR: 90  Standing BP: 128/99 HR: 97  Site: --  Mode: --  Orthostatic VS  04-16-24 @ 19:17  Lying BP: --/-- HR: --  Sitting BP: 117/82 HR: 96  Standing BP: 124/84 HR: 100  Site: --  Mode: --  Orthostatic VS  04-16-24 @ 08:16  Lying BP: --/-- HR: --  Sitting BP: 132/96 HR: 84  Standing BP: 128/99 HR: 94  Site: --  Mode: --  Orthostatic VS  04-15-24 @ 21:54  Lying BP: --/-- HR: --  Sitting BP: 138/79 HR: 88  Standing BP: 144/84 HR: 90  Site: --  Mode: --    Lipid Panel:

## 2024-04-17 NOTE — BH CHART NOTE - NSEVENTNOTEFT_PSY_ALL_CORE
Spoke with mother ([phone])  Patient seemed to be in good spirits last night during visit hours. Brother is helping with contacting police and legal course of action. Paranoia seems to be based on reality. Police didn't seem to help matters when patient called for help. Thinks that she would benefit from outpatient care but not inpatient any more. She is advocating for discharge

## 2024-04-17 NOTE — BH INPATIENT PSYCHIATRY PROGRESS NOTE - NSBHATTESTBILLING_PSY_A_CORE
01697-A[place]atksnyy OBS or IP - moderate complexity OR 35-49 mins
99223-Initial OBS or IP - high complexity OR  mins

## 2024-04-17 NOTE — BH INPATIENT PSYCHIATRY PROGRESS NOTE - NSBHASSESSSUMMFT_PSY_ALL_CORE
Patient is a 43-year-old female with a history of anxiety brought in by police after calling 911 because a [employer]  would not leave her porch. The patient was found to be manic, exhibiting tangential thought process and pressured speech. Her urine drug screen was positive for THC and amphetamines. Upon admission, the patient reports paranoia surrounding her boyfriend and ex-boyfriend but appears less manic compared to her initial presentation. The differential includes substance-induced bipolar disorder and bipolar 1 disorder. Given her recent behavior, she is a danger to herself and therefore meets criteria for inpatient psychiatric hospitalization.    DDx: stress/substance induced elliott; R/O bipolar disorder. R/O psychotic disorder.     On discussion today patient is grossly less manic, still with pressured speech and hyperverbality, but overall linear thought process. She expresses paranoia surrounding ex-boyfriend and [employer] , and is labile on interview intermittently crying when discussing her son. Suspect resolving manic episode or substance induced psychosis. Given timeline of symptoms, manic symptoms were secondary to increased stress and amounting potential threat, with possible interaction with cannabis and/or more recently prescribed Adderall.   4/17: continuing to demonstrate stable mood, affect, and behavior. Collateral history from mother supports patient's account of events.     Plan:  1. Legal: Admitted on/continue 9.27 status    2. Safety: No reported SI/SIB/HI/VI currently on unit; continue routine observation.  -PRN olanzapine 5mg PO/IM q6hr for agitation    3. Psychiatric:  -alprazolam 0.25 mg TID PRN for anxiety  -ambien 10mg nightly PRN for insomnia    4. Therapy: group & milieu therapy    5. Medical: No acute medical needs identified  -HTN: Amlodipine 10 mg daily, Losartan 25 mg daily (to replace home olmesartan 10 mg daily)  -Seasonal allergy: prn claritin daily; prn Robitussin for dry cough     6. Collateral:   - F/u with outpatient psych Dr. Valenzuela re: Adderall prescription  7. Disposition: When stable/pending clinical improvement (include possible disposition plans when known)        Plan:    -   - PRNs: Olanzepine 5 mg PO and IM PRN agitation, Ambien 5 mg PO qHS PRN insomnia Patient is a 43-year-old female with a history of anxiety brought in by police after calling 911 because a [employer]  would not leave her porch. The patient was found to be manic, exhibiting tangential thought process and pressured speech. Her urine drug screen was positive for THC and amphetamines. Upon admission, the patient reports paranoia surrounding her boyfriend and ex-boyfriend but appears less manic compared to her initial presentation. The differential includes substance-induced bipolar disorder and bipolar 1 disorder. Given her recent behavior, she is a danger to herself and therefore meets criteria for inpatient psychiatric hospitalization.  With no pharmacologic treatment, patient is linear, has no paranoia or AH, is not affectively dysregulated, irritable, or euphoric and so manic episode is less likely.    DDx: adjustment disorder, acute stress reaction, stress/substance induced elliott; R/O bipolar disorder. R/O psychotic disorder.     On discussion today patient is grossly less manic, still with pressured speech and hyperverbality, but overall linear thought process. She expresses paranoia surrounding ex-boyfriend and [employer] , and is labile on interview intermittently crying when discussing her son. Suspect resolving manic episode or substance induced psychosis. Given timeline of symptoms, manic symptoms were secondary to increased stress and amounting potential threat, with possible interaction with cannabis and/or more recently prescribed Adderall.   4/17: continuing to demonstrate stable mood, affect, and behavior. Collateral history from mother supports patient's account of events.     Plan:  1. Legal: Admitted on/continue 9.27 status    2. Safety: No reported SI/SIB/HI/VI currently on unit; continue routine observation.  -PRN olanzapine 5mg PO/IM q6hr for agitation    3. Psychiatric:  -alprazolam 0.25 mg TID PRN for anxiety  -ambien 10mg nightly PRN for insomnia    4. Therapy: group & milieu therapy    5. Medical: No acute medical needs identified  -HTN: Amlodipine 10 mg daily, Losartan 25 mg daily (to replace home olmesartan 10 mg daily)  -Seasonal allergy: prn claritin daily; prn Robitussin for dry cough     6. Collateral:   - F/u with outpatient psych Dr. Valenzuela re: Adderall prescription  7. Disposition: When stable/pending clinical improvement (include possible disposition plans when known)        Plan:    -   - PRNs: Olanzapine 5 mg PO and IM PRN agitation, Ambien 5 mg PO qHS PRN insomnia

## 2024-04-17 NOTE — BH INPATIENT PSYCHIATRY PROGRESS NOTE - NSBHCHARTREVIEWVS_PSY_A_CORE FT
Vital Signs Last 24 Hrs  T(C): 36.4 (04-17-24 @ 08:41), Max: 36.6 (04-16-24 @ 19:17)  T(F): 97.6 (04-17-24 @ 08:41), Max: 97.9 (04-16-24 @ 19:17)  HR: --  BP: --  BP(mean): --  RR: --  SpO2: --    Orthostatic VS  04-17-24 @ 08:41  Lying BP: --/-- HR: --  Sitting BP: 127/87 HR: 90  Standing BP: 128/99 HR: 97  Site: --  Mode: --  Orthostatic VS  04-16-24 @ 19:17  Lying BP: --/-- HR: --  Sitting BP: 117/82 HR: 96  Standing BP: 124/84 HR: 100  Site: --  Mode: --  Orthostatic VS  04-16-24 @ 08:16  Lying BP: --/-- HR: --  Sitting BP: 132/96 HR: 84  Standing BP: 128/99 HR: 94  Site: --  Mode: --  Orthostatic VS  04-15-24 @ 21:54  Lying BP: --/-- HR: --  Sitting BP: 138/79 HR: 88  Standing BP: 144/84 HR: 90  Site: --  Mode: --   Patient/Caregiver provided printed discharge information.

## 2024-04-17 NOTE — BH INPATIENT PSYCHIATRY PROGRESS NOTE - PRN MEDS
MEDICATIONS  (PRN):  ALPRAZolam 0.5 milliGRAM(s) Oral three times a day PRN anxiety  guaiFENesin Oral Liquid (Sugar-Free) 100 milliGRAM(s) Oral every 6 hours PRN cough  loratadine 10 milliGRAM(s) Oral daily PRN seasonal allergy  OLANZapine 5 milliGRAM(s) Oral four times a day PRN agitation  OLANZapine Injectable 5 milliGRAM(s) IntraMuscular once PRN severe agitation  zolpidem 10 milliGRAM(s) Oral at bedtime PRN Insomnia

## 2024-04-17 NOTE — BH INPATIENT PSYCHIATRY PROGRESS NOTE - NSBHATTESTCOMMENTATTENDFT_PSY_A_CORE
Agree with above.  She remains in good behavioral control without any signs of elliott or psychosis despite no treatment with new medications.  Collateral corroborates her account of events.  She most likely has acute stress reaction and/or adjustment disorder.  No new meds indicated.  Will discharge once outpatient follow up established.  
Agree with above.  Full admission interview conducted today.  Patient presents as linear and with appropriate affect, markedly different from presentation described in [facility] ED.  Will need further collateral to corroborate story and rule out paranoid ideation.  No new meds at this time.

## 2024-04-18 ENCOUNTER — EMERGENCY (EMERGENCY)
Facility: HOSPITAL | Age: 44
LOS: 1 days | Discharge: HOME | End: 2024-04-18
Attending: EMERGENCY MEDICINE
Payer: MEDICAID

## 2024-04-18 VITALS — TEMPERATURE: 98 F

## 2024-04-18 VITALS
HEIGHT: 63 IN | TEMPERATURE: 99 F | DIASTOLIC BLOOD PRESSURE: 99 MMHG | WEIGHT: 134.92 LBS | RESPIRATION RATE: 16 BRPM | OXYGEN SATURATION: 97 % | HEART RATE: 133 BPM | SYSTOLIC BLOOD PRESSURE: 146 MMHG

## 2024-04-18 VITALS — HEART RATE: 118 BPM

## 2024-04-18 PROCEDURE — 99239 HOSP IP/OBS DSCHRG MGMT >30: CPT

## 2024-04-18 PROCEDURE — 99283 EMERGENCY DEPT VISIT LOW MDM: CPT

## 2024-04-18 PROCEDURE — 99284 EMERGENCY DEPT VISIT MOD MDM: CPT

## 2024-04-18 RX ORDER — ZOLPIDEM TARTRATE 10 MG/1
5 TABLET ORAL AT BEDTIME
Refills: 0 | Status: DISCONTINUED | OUTPATIENT
Start: 2024-04-18 | End: 2024-04-18

## 2024-04-18 RX ORDER — ZOLPIDEM TARTRATE 10 MG/1
1 TABLET ORAL
Refills: 0 | DISCHARGE

## 2024-04-18 RX ORDER — AMLODIPINE BESYLATE 2.5 MG/1
1 TABLET ORAL
Refills: 0 | DISCHARGE

## 2024-04-18 RX ORDER — ZOLPIDEM TARTRATE 10 MG/1
1 TABLET ORAL
Qty: 30 | Refills: 0
Start: 2024-04-18 | End: 2024-05-17

## 2024-04-18 RX ORDER — ALPRAZOLAM 0.25 MG
0.5 TABLET ORAL ONCE
Refills: 0 | Status: DISCONTINUED | OUTPATIENT
Start: 2024-04-18 | End: 2024-04-18

## 2024-04-18 RX ORDER — LOSARTAN POTASSIUM 100 MG/1
1 TABLET, FILM COATED ORAL
Refills: 0 | DISCHARGE

## 2024-04-18 RX ORDER — ALPRAZOLAM 0.25 MG
1 TABLET ORAL
Qty: 0 | Refills: 0 | DISCHARGE
Start: 2024-04-18

## 2024-04-18 RX ORDER — ALPRAZOLAM 0.25 MG
1 TABLET ORAL
Refills: 0 | DISCHARGE

## 2024-04-18 RX ORDER — ALPRAZOLAM 0.25 MG
1 TABLET ORAL
Qty: 90 | Refills: 0
Start: 2024-04-18 | End: 2024-05-17

## 2024-04-18 RX ADMIN — Medication 0.5 MILLIGRAM(S): at 22:35

## 2024-04-18 RX ADMIN — LOSARTAN POTASSIUM 25 MILLIGRAM(S): 100 TABLET, FILM COATED ORAL at 08:16

## 2024-04-18 RX ADMIN — AMLODIPINE BESYLATE 10 MILLIGRAM(S): 2.5 TABLET ORAL at 08:17

## 2024-04-18 RX ADMIN — ZOLPIDEM TARTRATE 10 MILLIGRAM(S): 10 TABLET ORAL at 00:26

## 2024-04-18 RX ADMIN — Medication 0.5 MILLIGRAM(S): at 13:20

## 2024-04-18 RX ADMIN — ZOLPIDEM TARTRATE 5 MILLIGRAM(S): 10 TABLET ORAL at 22:35

## 2024-04-18 RX ADMIN — ZOLPIDEM TARTRATE 5 MILLIGRAM(S): 10 TABLET ORAL at 22:34

## 2024-04-18 NOTE — BH INPATIENT PSYCHIATRY DISCHARGE NOTE - NSDCCPCAREPLAN_GEN_ALL_CORE_FT
PRINCIPAL DISCHARGE DIAGNOSIS  Diagnosis: Adjustment disorder  Assessment and Plan of Treatment: You were diagnosed with an adjustment disorder, which describes a short-term reaction to extrenal stressors that can cause mood and anxiety symptoms. The symptoms improved without any medication, which further supports this diagnosis.

## 2024-04-18 NOTE — ED ADULT NURSE NOTE - OBJECTIVE STATEMENT
43 year old female presents to the ED for medication refill. Patient was discharged today after inpatient admission to [address]. Home medications released from [facility] pharmacy, envelope # [id number] and given to [facility] for transport on 4/15/24. [address] called, stated they do not have the medications. Pt reports that home medications were possibly lost in transit between [facility] and [facility]. Patient has no medical complaints, feels mildly anxious due to missing daily dose of meds. Denies pain, SOB, fever, chills, CP, headache, N/V/D. Mother at bedside for safe ride home. Comfort and safety measures maintained.

## 2024-04-18 NOTE — ED PROVIDER NOTE - NSFOLLOWUPINSTRUCTIONS_ED_ALL_ED_FT
1. You were seen for anxiety with need for medication refill.  You were given your medication dose here and 30-day prescription was sent to your pharmacy (Ambien 10mg and Xanax 0.25mg three times a day).  2. FOllow up psychiatrist and PCP for further eval and treatment.  3. Return for any concerns.

## 2024-04-18 NOTE — BH DISCHARGE NOTE NURSING/SOCIAL WORK/PSYCH REHAB - NSDCPRGOAL_PSY_ALL_CORE
Writer met with patient in order to discuss progress towards goal upon discharge. Pt has made fair progress, as patient has identifed increased anxiety and poor sleep as early warning signs. Writer provided support.     On the unit, patient was visible, interacting with selected peers. Patient was initially observed to be paranoid, anxious, and tearful upon admission, however upon discharge patient is in behavioral control and calm. Writer and patient completed safety plan. Patient plans to follow up with treatment. Patient denies SI upon discharge.     Patient attended approximately 90% of psychiatric rehabilitation groups during current hospitalization. Pt was verbally engaged and able to tolerate session structure.

## 2024-04-18 NOTE — BH DISCHARGE NOTE NURSING/SOCIAL WORK/PSYCH REHAB - PATIENT PORTAL LINK FT
You can access the FollowMyHealth Patient Portal offered by [facility] by registering at the following website: [email or web address]. By joining [employer]’s FollowMyHealth portal, you will also be able to view your health information using other applications (apps) compatible with our system.

## 2024-04-18 NOTE — ED ADULT NURSE NOTE - IS THE PATIENT ABLE TO BE SCREENED?
Patient: Luciana Henderson    Procedure Summary     Date:  03/21/19 Room / Location:  [facility] OR  / [facility] Yes

## 2024-04-18 NOTE — BH INPATIENT PSYCHIATRY DISCHARGE NOTE - NSBHFUPINTERVALCCFT_PSY_A_CORE
"I need to go home to my son." "I need to go home to my son."     Discharge Progress Note Date and Time: 04-18-24 @ 10:00
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
## 2024-04-18 NOTE — BH DISCHARGE NOTE NURSING/SOCIAL WORK/PSYCH REHAB - NSCDUDCCRISIS_PSY_A_CORE
[facility]  1 [phone] [facility]-WELL ([phone])  Text "WELL" to [phone]  Website: [email or web address].[employer]/.National Suicide Prevention Lifeline 3 (643) 371-2407/.  [facility]  [address] [place], [place] [address] [phone]   Hours:  Monday through Friday from 9 AM to 3 PM/988 Suicide and Crisis Lifeline

## 2024-04-18 NOTE — BH DISCHARGE NOTE NURSING/SOCIAL WORK/PSYCH REHAB - DISCHARGE INSTRUCTIONS AFTERCARE APPOINTMENTS
In order to check the location, date, or time of your aftercare appointment, please refer to your Discharge Instructions Document given to you upon leaving the hospital.  If you have lost the instructions please call [phone]

## 2024-04-18 NOTE — BH INPATIENT PSYCHIATRY DISCHARGE NOTE - MSE UNSTRUCTURED FT
On discussion today patient appears stated age, dressed in home clothes with good hygiene and grooming. She is cooperative with interview, well related, makes good eye contact. No abnormal movements are noted including tics, tremors, mannerisms, or psychomotor agitation or slowing. Speech is notable for increased quantity, but normal prosody, and volume. Mood is 'good' and affect is neutral, stable, congruent with stated mood. Thought process is grossly linear, and goal directed. Thought content is negative for any AVH, SIIP. Judgement, insight, impulse control intact. On discussion today patient appears stated age, dressed in home clothes with good hygiene and grooming. She is cooperative with interview, well related, makes good eye contact. No abnormal movements are noted including tics, tremors, mannerisms, or psychomotor agitation or slowing. Speech has normal rate, prosody, and volume. Mood is 'good' and affect is neutral, stable, congruent with stated mood. Thought process is grossly linear, and goal directed. Thought content is negative for any AVH, SIIP. Judgement, insight, impulse control intact.

## 2024-04-18 NOTE — ED PROVIDER NOTE - OBJECTIVE STATEMENT
Dr. Ayers Note: 43F with mother at bedside with lost meds while in hospital recently.  Pt seen here, tx to [facility], meds -- Xanax 0.25mg TID and Ambiem 10mg -- from home were lost en route to hospital.  Pt feeling increased anxiety without SI/HI/AH/VH.  ROS: no fever/cp/sob,vomiting/diarrhea.

## 2024-04-18 NOTE — BH INPATIENT PSYCHIATRY DISCHARGE NOTE - NSDCMRMEDTOKEN_GEN_ALL_CORE_FT
ALPRAZolam 0.5 mg oral tablet: 1 tab(s) orally 3 times a day As needed anxiety  Ambien 10 mg oral tablet: 1 tab(s) orally once a day (at bedtime) as needed for  insomnia  amLODIPine 10 mg oral tablet: 1 tab(s) orally once a day  losartan 25 mg oral tablet: 1 tab(s) orally once a day
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
## 2024-04-18 NOTE — ED PROVIDER NOTE - PATIENT PORTAL LINK FT
You can access the FollowMyHealth Patient Portal offered by [facility] by registering at the following website: [email or web address]. By joining [employer]’s FollowMyHealth portal, you will also be able to view your health information using other applications (apps) compatible with our system.

## 2024-04-18 NOTE — BH INPATIENT PSYCHIATRY DISCHARGE NOTE - NSBHMETABOLIC_PSY_ALL_CORE_FT
BMI: BMI (kg/m2): 24.8 (04-15-24 @ 21:54)  HbA1c:   Glucose:   BP: --Vital Signs Last 24 Hrs  T(C): 36.7 (04-18-24 @ 07:50), Max: 36.7 (04-17-24 @ 19:31)  T(F): 98.1 (04-18-24 @ 07:50), Max: 98.1 (04-17-24 @ 19:31)  HR: --  BP: --  BP(mean): --  RR: --  SpO2: --    Orthostatic VS  04-18-24 @ 07:50  Lying BP: --/-- HR: --  Sitting BP: 136/98 HR: 76  Standing BP: 128/91 HR: 86  Site: --  Mode: --  Orthostatic VS  04-17-24 @ 19:33  Lying BP: --/-- HR: --  Sitting BP: 125/86 HR: 92  Standing BP: 124/84 HR: 98  Site: --  Mode: --  Orthostatic VS  04-17-24 @ 19:31  Lying BP: --/-- HR: --  Sitting BP: 125/86 HR: 92  Standing BP: 124/84 HR: 98  Site: --  Mode: --  Orthostatic VS  04-17-24 @ 08:41  Lying BP: --/-- HR: --  Sitting BP: 127/87 HR: 90  Standing BP: 128/99 HR: 97  Site: --  Mode: --  Orthostatic VS  04-16-24 @ 19:17  Lying BP: --/-- HR: --  Sitting BP: 117/82 HR: 96  Standing BP: 124/84 HR: 100  Site: --  Mode: --    Lipid Panel:

## 2024-04-18 NOTE — ED PROVIDER NOTE - CLINICAL SUMMARY MEDICAL DECISION MAKING FREE TEXT BOX
chronic anxiety without acute emergency psychiatric condition (no SI/HI/AH/VH) for medication refill and acute treatment of chronic problem.  Give pt's medication dose in ER, outpatient treatment, send Rx.  No signs or concerns from mother about overdose, deemed low risk for abuse, appropriate to give med refill.  Pt has appt with psychiatrist next week.

## 2024-04-18 NOTE — BH INPATIENT PSYCHIATRY DISCHARGE NOTE - NSBHASSESSSUMMFT_PSY_ALL_CORE
Patient is a 43-year-old female with a history of anxiety brought in by police after calling 911 because a [employer]  would not leave her porch. The patient was found to be manic, exhibiting tangential thought process and pressured speech. Her urine drug screen was positive for THC and amphetamines. Upon admission, the patient reports paranoia surrounding her boyfriend and ex-boyfriend but appears less manic compared to her initial presentation. The differential includes substance-induced bipolar disorder and bipolar 1 disorder. Given her recent behavior, she is a danger to herself and therefore meets criteria for inpatient psychiatric hospitalization.  With no pharmacologic treatment, patient is linear, has no paranoia or AH, is not affectively dysregulated, irritable, or euphoric and so manic episode is less likely.    DDx: adjustment disorder, acute stress reaction, stress/substance induced elliott; R/O bipolar disorder. R/O psychotic disorder.     On discussion today patient is grossly less manic, linear and goal directed, denies SIIP, and is appropriate for discharge home. She expresses desire to seek employment and plan for family to stay with her due to ongoing interpersonal conflict with ex-boyfriend.  Patient is a 43-year-old female with a history of anxiety brought in by police after calling 911 because a [employer]  would not leave her porch. The patient was found to be manic, exhibiting tangential thought process and pressured speech. Her urine drug screen was positive for THC and amphetamines. Upon admission, the patient reports paranoia surrounding her ex-boyfriend but appears no longer manic compared to her initial presentation. With no pharmacologic treatment, patient is linear, has no paranoia or AH, is not affectively dysregulated, irritable, or euphoric and so manic episode is less likely.    DDx: adjustment disorder, acute stress reaction, stress/substance induced elliott; R/O bipolar disorder. R/O psychotic disorder.     On discussion today patient shows no evidence of manic signs/symptoms, linear and goal directed, denies SIIP, and is appropriate for discharge home. She expresses desire to seek employment and plan for family to stay with her due to ongoing interpersonal conflict with ex-boyfriend.

## 2024-05-06 ENCOUNTER — RX RENEWAL (OUTPATIENT)
Age: 44
End: 2024-05-06

## 2024-05-17 NOTE — SOCIAL WORK POST DISCHARGE FOLLOW UP NOTE - NSBHSWFOLLOWUP_PSY_ALL_CORE_FT
SW was notified that pt did not follow up with her outpatient intake at [facility]. SW tried to reach pt, but was unsuccessful. Pt did not return messages. Pt was not considered to be a danger to herself or others at the time of discharge by the treatment team.  Case closed.

## 2024-06-04 ENCOUNTER — RX RENEWAL (OUTPATIENT)
Age: 44
End: 2024-06-04

## 2024-06-04 RX ORDER — ALPRAZOLAM 0.5 MG/1
0.5 TABLET ORAL
Qty: 90 | Refills: 0 | Status: ACTIVE | COMMUNITY
Start: 2022-06-30 | End: 1900-01-01

## 2024-07-03 ENCOUNTER — RX RENEWAL (OUTPATIENT)
Age: 44
End: 2024-07-03

## 2024-08-28 ENCOUNTER — RX RENEWAL (OUTPATIENT)
Age: 44
End: 2024-08-28

## 2024-09-27 ENCOUNTER — RX RENEWAL (OUTPATIENT)
Age: 44
End: 2024-09-27

## 2024-10-28 ENCOUNTER — RX RENEWAL (OUTPATIENT)
Age: 44
End: 2024-10-28

## 2024-11-11 ENCOUNTER — RX RENEWAL (OUTPATIENT)
Age: 44
End: 2024-11-11

## 2024-12-03 ENCOUNTER — APPOINTMENT (OUTPATIENT)
Dept: INTERNAL MEDICINE | Facility: CLINIC | Age: 44
End: 2024-12-03

## 2024-12-24 ENCOUNTER — APPOINTMENT (OUTPATIENT)
Dept: INTERNAL MEDICINE | Facility: CLINIC | Age: 44
End: 2024-12-24

## 2025-01-08 ENCOUNTER — APPOINTMENT (OUTPATIENT)
Dept: INTERNAL MEDICINE | Facility: CLINIC | Age: 45
End: 2025-01-08

## 2025-01-22 ENCOUNTER — LABORATORY RESULT (OUTPATIENT)
Age: 45
End: 2025-01-22

## 2025-01-22 ENCOUNTER — APPOINTMENT (OUTPATIENT)
Dept: INTERNAL MEDICINE | Facility: CLINIC | Age: 45
End: 2025-01-22
Payer: MEDICAID

## 2025-01-22 ENCOUNTER — NON-APPOINTMENT (OUTPATIENT)
Age: 45
End: 2025-01-22

## 2025-01-22 VITALS
BODY MASS INDEX: 28.35 KG/M2 | SYSTOLIC BLOOD PRESSURE: 167 MMHG | HEIGHT: 62.6 IN | DIASTOLIC BLOOD PRESSURE: 118 MMHG | OXYGEN SATURATION: 99 % | HEART RATE: 96 BPM | TEMPERATURE: 98.1 F | WEIGHT: 158 LBS

## 2025-01-22 DIAGNOSIS — M79.10 MYALGIA, UNSPECIFIED SITE: ICD-10-CM

## 2025-01-22 DIAGNOSIS — I10 ESSENTIAL (PRIMARY) HYPERTENSION: ICD-10-CM

## 2025-01-22 DIAGNOSIS — M54.2 CERVICALGIA: ICD-10-CM

## 2025-01-22 DIAGNOSIS — M54.50 LOW BACK PAIN, UNSPECIFIED: ICD-10-CM

## 2025-01-22 DIAGNOSIS — R79.89 OTHER SPECIFIED ABNORMAL FINDINGS OF BLOOD CHEMISTRY: ICD-10-CM

## 2025-01-22 DIAGNOSIS — R25.2 CRAMP AND SPASM: ICD-10-CM

## 2025-01-22 DIAGNOSIS — G89.29 CERVICALGIA: ICD-10-CM

## 2025-01-22 DIAGNOSIS — M54.9 CERVICALGIA: ICD-10-CM

## 2025-01-22 DIAGNOSIS — G89.29 LOW BACK PAIN, UNSPECIFIED: ICD-10-CM

## 2025-01-22 DIAGNOSIS — R03.0 ELEVATED BLOOD-PRESSURE READING, W/OUT DIAGNOSIS OF HYPERTENSION: ICD-10-CM

## 2025-01-22 DIAGNOSIS — Z00.00 ENCOUNTER FOR GENERAL ADULT MEDICAL EXAMINATION W/OUT ABNORMAL FINDINGS: ICD-10-CM

## 2025-01-22 PROCEDURE — 36415 COLL VENOUS BLD VENIPUNCTURE: CPT

## 2025-01-22 PROCEDURE — G0444 DEPRESSION SCREEN ANNUAL: CPT | Mod: 59

## 2025-01-22 PROCEDURE — 99396 PREV VISIT EST AGE 40-64: CPT

## 2025-01-22 RX ORDER — TIZANIDINE 4 MG/1
4 TABLET ORAL
Qty: 30 | Refills: 1 | Status: ACTIVE | COMMUNITY
Start: 2025-01-22 | End: 1900-01-01

## 2025-01-24 ENCOUNTER — EMERGENCY (EMERGENCY)
Facility: HOSPITAL | Age: 45
LOS: 1 days | Discharge: HOME | End: 2025-01-24
Attending: STUDENT IN AN ORGANIZED HEALTH CARE EDUCATION/TRAINING PROGRAM | Admitting: STUDENT IN AN ORGANIZED HEALTH CARE EDUCATION/TRAINING PROGRAM
Payer: MEDICAID

## 2025-01-24 VITALS
WEIGHT: 149.91 LBS | HEART RATE: 76 BPM | RESPIRATION RATE: 16 BRPM | DIASTOLIC BLOOD PRESSURE: 102 MMHG | SYSTOLIC BLOOD PRESSURE: 180 MMHG | TEMPERATURE: 98 F | OXYGEN SATURATION: 100 %

## 2025-01-24 VITALS
OXYGEN SATURATION: 100 % | DIASTOLIC BLOOD PRESSURE: 109 MMHG | RESPIRATION RATE: 16 BRPM | TEMPERATURE: 98 F | SYSTOLIC BLOOD PRESSURE: 175 MMHG | HEART RATE: 82 BPM

## 2025-01-24 PROCEDURE — 99284 EMERGENCY DEPT VISIT MOD MDM: CPT

## 2025-01-24 RX ORDER — PREDNISONE 5 MG
50 TABLET ORAL ONCE
Refills: 0 | Status: DISCONTINUED | OUTPATIENT
Start: 2025-01-24 | End: 2025-01-24

## 2025-01-24 RX ORDER — LIDOCAINE 50 MG/G
1 OINTMENT TOPICAL ONCE
Refills: 0 | Status: DISCONTINUED | OUTPATIENT
Start: 2025-01-24 | End: 2025-01-24

## 2025-01-24 RX ORDER — LIDOCAINE 50 MG/G
0 OINTMENT TOPICAL
Qty: 3 | Refills: 0
Start: 2025-01-24

## 2025-01-24 RX ORDER — DIAZEPAM 5 MG
5 TABLET ORAL ONCE
Refills: 0 | Status: DISCONTINUED | OUTPATIENT
Start: 2025-01-24 | End: 2025-01-24

## 2025-01-24 RX ORDER — KETOROLAC TROMETHAMINE 30 MG/ML
30 INJECTION INTRAMUSCULAR; INTRAVENOUS ONCE
Refills: 0 | Status: DISCONTINUED | OUTPATIENT
Start: 2025-01-24 | End: 2025-01-24

## 2025-01-24 RX ORDER — LIDOCAINE 50 MG/G
1 OINTMENT TOPICAL
Qty: 1 | Refills: 0
Start: 2025-01-24 | End: 2025-01-26

## 2025-01-24 RX ORDER — ACETAMINOPHEN 80 MG/.8ML
975 SOLUTION/ DROPS ORAL ONCE
Refills: 0 | Status: DISCONTINUED | OUTPATIENT
Start: 2025-01-24 | End: 2025-01-24

## 2025-01-24 RX ORDER — PREDNISONE 5 MG
1 TABLET ORAL
Qty: 4 | Refills: 0
Start: 2025-01-24 | End: 2025-01-27

## 2025-01-24 NOTE — ED PROVIDER NOTE - PHYSICAL EXAMINATION
VITALS: reviewed  GEN: NAD, A & O x 4  HEAD/EYES: NCAT, EOMI, anicteric sclerae,   ENT: mucus membranes moist, oropharynx WNL, trachea midline,  RESP: lungs CTA with equal breath sounds bilaterally, chest wall nontender and atraumatic  CV: heart with reg rhythm S1, S2, distal pulses intact and symmetric bilaterally  ABDOMEN: normoactive bowel sounds, soft, nondistended, nontender, no palpable masses  : no CVAT  MSK: extremities atraumatic and nontender, no edema, no asymmetry. No midline spinal ttp. tenderness along L lateral neck/shoulder with point tenderness.   SKIN: warm, dry, no rash, no bruising, no cyanosis. color appropriate for ethnicity  NEURO: alert, mentating appropriately, no facial asymmetry. + SLR on R.   PSYCH: Affect appropriate

## 2025-01-24 NOTE — ED PROVIDER NOTE - PROGRESS NOTE DETAILS
BP elevated, repeated before DC and still elevated. Pt denied elevated BP. Pt left before receiving DC papers or discussing concern over BP with her. Suspected elevated BP in setting of pain, but patient deferred pain meds in ED today. Attempted to call patient to recommend follow up for elevated blood pressure but no answer.

## 2025-01-24 NOTE — ED PROVIDER NOTE - CLINICAL SUMMARY MEDICAL DECISION MAKING FREE TEXT BOX
45 yo F hx chronic L sided neck pain, anxiety on xanax prn (did not take today), presenting with complaints of R sided back pain radiating down her R leg. Neck pain is worse upon waking up in the morning. She has been taking ibuprofen at home with minimal relief. Prescribed flexeril by pcp but states that it just made her tired. Denies recent falls/trauma. No changes in urination. Reports soft stools in AM. No fecal incontinence/constipation. PCP recommended she follow up with pain management. + R SLR. + tenderness along L trapezius. No red flag sx. Offered pain meds today but patient does not want to wait. Will attempt to treat with short course of steroids.  Recommend pain follow up. DC with return recs.

## 2025-01-24 NOTE — ED PROVIDER NOTE - NSFOLLOWUPINSTRUCTIONS_ED_ALL_ED_FT
Take Tylenol 650mg (Two 325 mg pills) every 4-6 hours as needed for pain.  Take Motrin 400 mg every 6 hours as needed for moderate pain -- take with food.  A lidocaine patch may be purchased over the counter at your local pharmacy, use for maximum 12 hours in 24 hour period.     Follow up with pain management.       Back Pain    Back pain is very common in adults. The cause of back pain is rarely dangerous and the pain often gets better over time. The cause of your back pain may not be known and may include strain of muscles or ligaments, degeneration of the spinal disks, or arthritis. Occasionally the pain may radiate down your leg(s). Over-the-counter medicines to reduce pain and inflammation are often the most helpful. Stretching and remaining active frequently helps the healing process.     SEEK IMMEDIATE MEDICAL CARE IF YOU HAVE ANY OF THE FOLLOWING SYMPTOMS: bowel or bladder control problems, unusual weakness or numbness in your arms or legs, nausea or vomiting, abdominal pain, fever, dizziness/lightheadedness.    .

## 2025-01-24 NOTE — ED PROVIDER NOTE - PATIENT PORTAL LINK FT
You can access the FollowMyHealth Patient Portal offered by [facility] by registering at the following website: [email or web address]. By joining [facility]’s FollowMyHealth portal, you will also be able to view your health information using other applications (apps) compatible with our system.

## 2025-01-24 NOTE — ED ADULT TRIAGE NOTE - CHIEF COMPLAINT QUOTE
Pt complains of lower back pain x 2 weeks worsened over 4 days. pain radiates to both legs, Ambulatory. Pt denies urinary/fecal incontinence. No recent falls. History of anxiety, HTN. Pt did not take BP meds today. Denies chest pain, SOB, headache, dizziness, blurred vision. Systolics 180s. states pain is 10/10 but is well-appearing.

## 2025-01-31 DIAGNOSIS — R79.89 OTHER SPECIFIED ABNORMAL FINDINGS OF BLOOD CHEMISTRY: ICD-10-CM

## 2025-01-31 LAB
25(OH)D3 SERPL-MCNC: 9.6 NG/ML
ALBUMIN MFR SERPL ELPH: 60.4 %
ALBUMIN SERPL ELPH-MCNC: 4.5 G/DL
ALBUMIN SERPL-MCNC: 4.5 G/DL
ALBUMIN/GLOB SERPL: 1.6 RATIO
ALBUPE: 32.5 %
ALP BLD-CCNC: 64 U/L
ALPHA1 GLOB MFR SERPL ELPH: 3.8 %
ALPHA1 GLOB SERPL ELPH-MCNC: 0.3 G/DL
ALPHA1UPE: 25.3 %
ALPHA2 GLOB MFR SERPL ELPH: 9.5 %
ALPHA2 GLOB SERPL ELPH-MCNC: 0.7 G/DL
ALPHA2UPE: 19 %
ALT SERPL-CCNC: 27 U/L
ANA SER IF-ACNC: NEGATIVE
ANION GAP SERPL CALC-SCNC: 12 MMOL/L
APPEARANCE: CLEAR
AST SERPL-CCNC: 24 U/L
B-GLOBULIN MFR SERPL ELPH: 11.5 %
B-GLOBULIN SERPL ELPH-MCNC: 0.9 G/DL
BASOPHILS # BLD AUTO: 0.03 K/UL
BASOPHILS NFR BLD AUTO: 0.3 %
BETAUPE: 14.5 %
BILIRUB SERPL-MCNC: 0.3 MG/DL
BILIRUBIN URINE: NEGATIVE
BLOOD URINE: NEGATIVE
BUN SERPL-MCNC: 8 MG/DL
CALCIUM SERPL-MCNC: 9.4 MG/DL
CCP AB SER IA-ACNC: <8 U/ML
CHLORIDE SERPL-SCNC: 101 MMOL/L
CHOLEST SERPL-MCNC: 237 MG/DL
CO2 SERPL-SCNC: 26 MMOL/L
COLOR: YELLOW
CREAT 24H UR-MCNC: NORMAL G/24 H
CREAT SERPL-MCNC: 0.65 MG/DL
CREATININE UR (MAYO): 273 MG/DL
CRP SERPL-MCNC: 3 MG/L
DEPRECATED KAPPA LC FREE/LAMBDA SER: 1.22 RATIO
DSDNA AB SER-ACNC: 2 IU/ML
EGFR: 112 ML/MIN/1.73M2
ENA RNP AB SER IA-ACNC: 0.2 AL
ENA SM AB SER IA-ACNC: <0.2 AL
ENA SS-A AB SER IA-ACNC: <0.2 AL
ENA SS-B AB SER IA-ACNC: <0.2 AL
EOSINOPHIL # BLD AUTO: 0.22 K/UL
EOSINOPHIL NFR BLD AUTO: 2.3 %
ERYTHROCYTE [SEDIMENTATION RATE] IN BLOOD BY WESTERGREN METHOD: 12 MM/HR
GAMMA GLOB FLD ELPH-MCNC: 1.1 G/DL
GAMMA GLOB MFR SERPL ELPH: 14.8 %
GAMMAUPE: 8.7 %
GLUCOSE QUALITATIVE U: NEGATIVE
GLUCOSE SERPL-MCNC: 95 MG/DL
HBA1C MFR BLD HPLC: 5.1 %
HCT VFR BLD CALC: 40.1 %
HDLC SERPL-MCNC: 60 MG/DL
HGB BLD-MCNC: 12.8 G/DL
HLA-B27 QL NAA+PROBE: NORMAL
IGA 24H UR QL IFE: NORMAL
IGA SER QL IEP: 189 MG/DL
IGG SER QL IEP: 1125 MG/DL
IGM SER QL IEP: 69 MG/DL
IMM GRANULOCYTES NFR BLD AUTO: 0.2 %
INTERPRETATION SERPL IEP-IMP: NORMAL
KAPPA LC 24H UR QL: NORMAL
KAPPA LC CSF-MCNC: 0.97 MG/DL
KAPPA LC SERPL-MCNC: 1.18 MG/DL
KETONES URINE: ABNORMAL
LDLC SERPL CALC-MCNC: 157 MG/DL
LEUKOCYTE ESTERASE URINE: NEGATIVE
LYMPHOCYTES # BLD AUTO: 1.28 K/UL
LYMPHOCYTES NFR BLD AUTO: 13.5 %
M PROTEIN SPEC IFE-MCNC: NORMAL
MAN DIFF?: NORMAL
MCHC RBC-ENTMCNC: 28.5 PG
MCHC RBC-ENTMCNC: 31.9 G/DL
MCV RBC AUTO: 89.3 FL
MONOCYTES # BLD AUTO: 0.4 K/UL
MONOCYTES NFR BLD AUTO: 4.2 %
NEUTROPHILS # BLD AUTO: 7.53 K/UL
NEUTROPHILS NFR BLD AUTO: 79.5 %
NITRITE URINE: NEGATIVE
NONHDLC SERPL-MCNC: 177 MG/DL
PH URINE: 6.5
PLATELET # BLD AUTO: 385 K/UL
POTASSIUM SERPL-SCNC: 4 MMOL/L
PROT PATTERN 24H UR ELPH-IMP: NORMAL
PROT SERPL-MCNC: 7.4 G/DL
PROT UR-MCNC: 28 MG/DL
PROT UR-MCNC: 28 MG/DL
PROTEIN URINE: ABNORMAL
RBC # BLD: 4.49 M/UL
RBC # FLD: 13.9 %
RF+CCP IGG SER-IMP: NEGATIVE
RHEUMATOID FACT SER QL: 17 IU/ML
SODIUM SERPL-SCNC: 139 MMOL/L
SPECIFIC GRAVITY URINE: >=1.03
SPECIMEN VOL 24H UR: NORMAL ML
TRIGL SERPL-MCNC: 109 MG/DL
TSH SERPL-ACNC: 0.31 UIU/ML
UROBILINOGEN URINE: NORMAL
VIT B12 SERPL-MCNC: 677 PG/ML
WBC # FLD AUTO: 9.48 K/UL

## 2025-01-31 RX ORDER — ERGOCALCIFEROL 1.25 MG/1
1.25 MG CAPSULE ORAL
Qty: 12 | Refills: 1 | Status: ACTIVE | COMMUNITY
Start: 2025-01-31 | End: 1900-01-01

## 2025-02-19 ENCOUNTER — RX RENEWAL (OUTPATIENT)
Age: 45
End: 2025-02-19

## 2025-02-24 NOTE — ED BEHAVIORAL HEALTH ASSESSMENT NOTE - NSBHMSETHTCONTENT_PSY_A_CORE
02/24/25      Jaky KRAUSE Jordana  [address]  [facility] [id number]    Dear Ms. Coenaina,     My name is Prudence Torres RN.  I am an Outpatient Care Manager working with [facility].  I provide Care Management Services at no extra cost to you. I can support you to follow your doctor’s plan of care.  I can arrange the services needed to help you do the things you want and help you stay as healthy as possible.     As your care manager, I work with you and Margie Vincent MD to help you get the best care. My job is to:    Help you understand the type of care you need   Make sure you know where you can find the treatments you need   Assist you/your family in finding resources to meet your healthcare needs.   Coordinate/set up community resources   Assist you with maximizing your health care benefits     I will call in a few days to talk about your needs and look forward to speaking with you soon.  If you have any questions or if I can help in any way, please call me at [phone] and leave a private voicemail message.      Sincerely,     Prudence Torres RN  Care Manager  [facility]    Delusions

## 2025-03-07 DIAGNOSIS — J32.9 CHRONIC SINUSITIS, UNSPECIFIED: ICD-10-CM

## 2025-03-07 RX ORDER — AMOXICILLIN AND CLAVULANATE POTASSIUM 875; 125 MG/1; MG/1
875-125 TABLET, COATED ORAL
Qty: 14 | Refills: 0 | Status: ACTIVE | COMMUNITY
Start: 2025-03-07 | End: 1900-01-01

## 2025-03-18 ENCOUNTER — RX RENEWAL (OUTPATIENT)
Age: 45
End: 2025-03-18

## 2025-03-25 ENCOUNTER — RX RENEWAL (OUTPATIENT)
Age: 45
End: 2025-03-25

## 2025-04-25 ENCOUNTER — RX RENEWAL (OUTPATIENT)
Age: 45
End: 2025-04-25

## 2025-05-13 ENCOUNTER — RX RENEWAL (OUTPATIENT)
Age: 45
End: 2025-05-13

## 2025-05-27 ENCOUNTER — RX RENEWAL (OUTPATIENT)
Age: 45
End: 2025-05-27

## 2025-06-27 ENCOUNTER — RX RENEWAL (OUTPATIENT)
Age: 45
End: 2025-06-27

## 2025-07-22 ENCOUNTER — TRANSCRIPTION ENCOUNTER (OUTPATIENT)
Age: 45
End: 2025-07-22

## 2025-07-23 ENCOUNTER — RX RENEWAL (OUTPATIENT)
Age: 45
End: 2025-07-23

## 2025-09-18 ENCOUNTER — APPOINTMENT (OUTPATIENT)
Dept: OBGYN | Facility: CLINIC | Age: 45
End: 2025-09-18

## 2025-09-18 VITALS
WEIGHT: 155 LBS | BODY MASS INDEX: 27.81 KG/M2 | DIASTOLIC BLOOD PRESSURE: 98 MMHG | HEIGHT: 62.6 IN | HEART RATE: 103 BPM | SYSTOLIC BLOOD PRESSURE: 153 MMHG

## 2025-09-19 ENCOUNTER — APPOINTMENT (OUTPATIENT)
Dept: OBGYN | Facility: CLINIC | Age: 45
End: 2025-09-19